# Patient Record
Sex: FEMALE | Race: AMERICAN INDIAN OR ALASKA NATIVE
[De-identification: names, ages, dates, MRNs, and addresses within clinical notes are randomized per-mention and may not be internally consistent; named-entity substitution may affect disease eponyms.]

---

## 2019-01-18 ENCOUNTER — HOSPITAL ENCOUNTER (INPATIENT)
Dept: HOSPITAL 5 - ED | Age: 64
LOS: 5 days | Discharge: HOME HEALTH | DRG: 393 | End: 2019-01-23
Attending: INTERNAL MEDICINE | Admitting: INTERNAL MEDICINE
Payer: MEDICARE

## 2019-01-18 DIAGNOSIS — F17.200: ICD-10-CM

## 2019-01-18 DIAGNOSIS — E83.52: ICD-10-CM

## 2019-01-18 DIAGNOSIS — J32.0: ICD-10-CM

## 2019-01-18 DIAGNOSIS — K59.00: ICD-10-CM

## 2019-01-18 DIAGNOSIS — Z88.8: ICD-10-CM

## 2019-01-18 DIAGNOSIS — G93.41: ICD-10-CM

## 2019-01-18 DIAGNOSIS — G89.29: ICD-10-CM

## 2019-01-18 DIAGNOSIS — K52.9: ICD-10-CM

## 2019-01-18 DIAGNOSIS — K29.70: ICD-10-CM

## 2019-01-18 DIAGNOSIS — G30.9: ICD-10-CM
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
## 2019-01-18 DIAGNOSIS — E11.649: ICD-10-CM
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
## 2019-01-18 DIAGNOSIS — M19.90: ICD-10-CM

## 2019-01-18 DIAGNOSIS — F02.80: ICD-10-CM

## 2019-01-18 DIAGNOSIS — Z23: ICD-10-CM

## 2019-01-18 DIAGNOSIS — E46: ICD-10-CM

## 2019-01-18 DIAGNOSIS — R55: ICD-10-CM

## 2019-01-18 DIAGNOSIS — Z71.6: ICD-10-CM

## 2019-01-18 DIAGNOSIS — K94.23: Primary | ICD-10-CM

## 2019-01-18 DIAGNOSIS — I10: ICD-10-CM

## 2019-01-18 DIAGNOSIS — Z88.1: ICD-10-CM

## 2019-01-18 DIAGNOSIS — M54.6: ICD-10-CM

## 2019-01-18 LAB
BASOPHILS # (AUTO): 0.1 K/MM3 (ref 0–0.1)
BASOPHILS NFR BLD AUTO: 1 % (ref 0–1.8)
EOSINOPHIL # BLD AUTO: 0.1 K/MM3 (ref 0–0.4)
EOSINOPHIL NFR BLD AUTO: 0.9 % (ref 0–4.3)
HCT VFR BLD CALC: 40.9 % (ref 30.3–42.9)
HGB BLD-MCNC: 13.4 GM/DL (ref 10.1–14.3)
LYMPHOCYTES # BLD AUTO: 2.8 K/MM3 (ref 1.2–5.4)
LYMPHOCYTES NFR BLD AUTO: 35 % (ref 13.4–35)
MCHC RBC AUTO-ENTMCNC: 33 % (ref 30–34)
MCV RBC AUTO: 91 FL (ref 79–97)
MONOCYTES # (AUTO): 0.5 K/MM3 (ref 0–0.8)
MONOCYTES % (AUTO): 6.7 % (ref 0–7.3)
PLATELET # BLD: 293 K/MM3 (ref 140–440)
RBC # BLD AUTO: 4.5 M/MM3 (ref 3.65–5.03)

## 2019-01-18 PROCEDURE — 90732 PPSV23 VACC 2 YRS+ SUBQ/IM: CPT

## 2019-01-18 PROCEDURE — 71275 CT ANGIOGRAPHY CHEST: CPT

## 2019-01-18 PROCEDURE — 93010 ELECTROCARDIOGRAM REPORT: CPT

## 2019-01-18 PROCEDURE — 36415 COLL VENOUS BLD VENIPUNCTURE: CPT

## 2019-01-18 PROCEDURE — 70551 MRI BRAIN STEM W/O DYE: CPT

## 2019-01-18 PROCEDURE — 85025 COMPLETE CBC W/AUTO DIFF WBC: CPT

## 2019-01-18 PROCEDURE — 85379 FIBRIN DEGRADATION QUANT: CPT

## 2019-01-18 PROCEDURE — 93005 ELECTROCARDIOGRAM TRACING: CPT

## 2019-01-18 PROCEDURE — 90686 IIV4 VACC NO PRSV 0.5 ML IM: CPT

## 2019-01-18 PROCEDURE — 82140 ASSAY OF AMMONIA: CPT

## 2019-01-18 PROCEDURE — 80048 BASIC METABOLIC PNL TOTAL CA: CPT

## 2019-01-18 PROCEDURE — 93880 EXTRACRANIAL BILAT STUDY: CPT

## 2019-01-18 PROCEDURE — 83970 ASSAY OF PARATHORMONE: CPT

## 2019-01-18 PROCEDURE — 85027 COMPLETE CBC AUTOMATED: CPT

## 2019-01-18 PROCEDURE — 70450 CT HEAD/BRAIN W/O DYE: CPT

## 2019-01-18 PROCEDURE — 87040 BLOOD CULTURE FOR BACTERIA: CPT

## 2019-01-18 PROCEDURE — 82962 GLUCOSE BLOOD TEST: CPT

## 2019-01-18 PROCEDURE — C9113 INJ PANTOPRAZOLE SODIUM, VIA: HCPCS

## 2019-01-18 PROCEDURE — 74177 CT ABD & PELVIS W/CONTRAST: CPT

## 2019-01-18 PROCEDURE — 80053 COMPREHEN METABOLIC PANEL: CPT

## 2019-01-18 PROCEDURE — 93306 TTE W/DOPPLER COMPLETE: CPT

## 2019-01-18 PROCEDURE — 82550 ASSAY OF CK (CPK): CPT

## 2019-01-18 PROCEDURE — 84484 ASSAY OF TROPONIN QUANT: CPT

## 2019-01-18 PROCEDURE — 87116 MYCOBACTERIA CULTURE: CPT

## 2019-01-18 PROCEDURE — 70544 MR ANGIOGRAPHY HEAD W/O DYE: CPT

## 2019-01-18 PROCEDURE — 71045 X-RAY EXAM CHEST 1 VIEW: CPT

## 2019-01-18 PROCEDURE — 99406 BEHAV CHNG SMOKING 3-10 MIN: CPT

## 2019-01-18 RX ADMIN — CEFTRIAXONE SODIUM SCH MLS/HR: 2 INJECTION, POWDER, FOR SOLUTION INTRAMUSCULAR; INTRAVENOUS at 22:35

## 2019-01-18 NOTE — EMERGENCY DEPARTMENT REPORT
ED General Adult HPI





- General


Chief complaint: Weakness


Stated complaint: BODY PAIN


Time Seen by Provider: 01/18/19 22:50


Source: patient, EMS


Mode of arrival: Stretcher


Limitations: No Limitations





- History of Present Illness


Initial comments: 


Patient is a 63-year-old female past medical history of arthritis and high blood

pressure presents with back pain patient states that she's been having some 

upper back pain and weakness has been going on for the last couple days.  She 

states for the last couple days she hasn't really been able to have much of an 

appetite.  Patient has a PEG tube inserted.  Patient's dad denies having chest 

pain any dysuria or any cough.  Her pain is a 7 out of 10 located in the upper 

back that doesn't radiate nothing makes it better or worse.








- Related Data


                                  Previous Rx's











 Medication  Instructions  Recorded  Last Taken  Type


 


Sulfamethoxazole/Trimethoprim 1 each PO BID #14 tablet 01/05/19 Unknown Rx





[Bactrim DS TAB]    


 


HYDROcodone/APAP 5-325 [[place] 1 - 2 each PO Q6HR PRN #14 tablet 01/19/19 Unknown

 Rx





5-325 mg TAB]    











                                    Allergies











Allergy/AdvReac Type Severity Reaction Status Date / Time


 


acetaminophen Allergy  Hives Verified 01/04/19 19:19





[From Darvocet-N 100]     


 


ketorolac [From Toradol] Allergy  Hives Verified 01/04/19 19:19


 


propoxyphene Allergy  Hives Verified 01/04/19 19:19





[From Darvocet-N 100]     














ED Review of Systems


ROS: 


Stated complaint: BODY PAIN


Other details as noted in HPI





Constitutional: denies: chills, fever


Eyes: denies: eye pain, eye discharge, vision change


ENT: denies: ear pain, throat pain


Respiratory: denies: cough, shortness of breath, wheezing


Cardiovascular: denies: chest pain, palpitations


Endocrine: no symptoms reported


Gastrointestinal: denies: abdominal pain, nausea, diarrhea


Genitourinary: denies: urgency, dysuria, discharge


Musculoskeletal: back pain.  denies: joint swelling, arthralgia


Skin: denies: rash, lesions


Neurological: denies: headache, weakness, paresthesias


Psychiatric: denies: anxiety, depression


Hematological/Lymphatic: denies: easy bleeding, easy bruising





ED Past Medical Hx





- Past Medical History


Previous Medical History?: Yes


Hx Hypertension: Yes


Hx Diabetes: Yes





- Surgical History


Past Surgical History?: Yes


Additional Surgical History: peg tube, gastric bypass





- Social History


Smoking Status: Former Smoker


Substance Use Type: None





- Medications


Home Medications: 


                                Home Medications











 Medication  Instructions  Recorded  Confirmed  Last Taken  Type


 


Sulfamethoxazole/Trimethoprim 1 each PO BID #14 tablet 01/05/19 01/19/19 Unknown

Rx





[Bactrim DS TAB]     


 


HYDROcodone/APAP 5-325 [[place]re 1 - 2 each PO Q6HR PRN #14 tablet 01/19/19  

Unknown Rx





5-325 mg TAB]     














ED Physical Exam





- General


Limitations: No Limitations


General appearance: alert, in no apparent distress





- Head


Head exam: Present: atraumatic, normocephalic





- Eye


Eye exam: Present: normal appearance





- ENT


ENT exam: Present: mucous membranes moist





- Neck


Neck exam: Present: normal inspection





- Respiratory


Respiratory exam: Present: normal lung sounds bilaterally.  Absent: respiratory 

distress





- Cardiovascular


Cardiovascular Exam: Present: normal rhythm, tachycardia.  Absent: systolic 

murmur, diastolic murmur, rubs, gallop





- GI/Abdominal


GI/Abdominal exam: Present: soft, normal bowel sounds





- Extremities Exam


Extremities exam: Present: normal inspection





- Back Exam


Back exam: Present: normal inspection





- Neurological Exam


Neurological exam: Present: alert, oriented X3





- Psychiatric


Psychiatric exam: Present: normal affect, normal mood





- Skin


Skin exam: Present: warm, dry, intact, normal color.  Absent: rash





ED Course


                                   Vital Signs











  01/18/19 01/18/19 01/18/19





  22:46 22:50 23:00


 


Temperature  98.3 F 


 


Pulse Rate  119 H 109 H


 


Respiratory  28 H 10 L





Rate   


 


Blood Pressure  164/82 139/84


 


Blood Pressure  164/82 





[Left]   


 


O2 Sat by Pulse 93 100 91





Oximetry   














  01/19/19 01/19/19





  00:01 02:00


 


Temperature  


 


Pulse Rate 106 H 92 H


 


Respiratory 14 10 L





Rate  


 


Blood Pressure 139/84 132/63


 


Blood Pressure  





[Left]  


 


O2 Sat by Pulse 98 100





Oximetry  














ED Medical Decision Making





- Lab Data


Result diagrams: 


                                 01/18/19 23:13





                                 01/18/19 23:13








                                   Lab Results











  01/18/19 01/18/19 01/18/19 Range/Units





  23:13 23:13 23:13 


 


WBC  7.9    (4.5-11.0)  K/mm3


 


RBC  4.50    (3.65-5.03)  M/mm3


 


Hgb  13.4    (10.1-14.3)  gm/dl


 


Hct  40.9    (30.3-42.9)  %


 


MCV  91    (79-97)  fl


 


MCH  30    (28-32)  pg


 


MCHC  33    (30-34)  %


 


RDW  13.2    (13.2-15.2)  %


 


Plt Count  293    (140-440)  K/mm3


 


Lymph % (Auto)  35.0    (13.4-35.0)  %


 


Mono % (Auto)  6.7    (0.0-7.3)  %


 


Eos % (Auto)  0.9    (0.0-4.3)  %


 


Baso % (Auto)  1.0    (0.0-1.8)  %


 


Lymph #  2.8    (1.2-5.4)  K/mm3


 


Mono #  0.5    (0.0-0.8)  K/mm3


 


Eos #  0.1    (0.0-0.4)  K/mm3


 


Baso #  0.1    (0.0-0.1)  K/mm3


 


Seg Neutrophils %  56.4    (40.0-70.0)  %


 


Seg Neutrophils #  4.4    (1.8-7.7)  K/mm3


 


Sodium   137   (137-145)  mmol/L


 


Potassium   3.7   (3.6-5.0)  mmol/L


 


Chloride   100.2   ()  mmol/L


 


Carbon Dioxide   20 L   (22-30)  mmol/L


 


Anion Gap   21   mmol/L


 


BUN   9   (7-17)  mg/dL


 


Creatinine   1.2   (0.7-1.2)  mg/dL


 


Estimated GFR   55   ml/min


 


BUN/Creatinine Ratio   8   %


 


Glucose   115 H   ()  mg/dL


 


Calcium   11.9 H   (8.4-10.2)  mg/dL


 


Total Bilirubin   0.40   (0.1-1.2)  mg/dL


 


AST   14   (5-40)  units/L


 


ALT   15   (7-56)  units/L


 


Alkaline Phosphatase   35   ()  units/L


 


Troponin T    < 0.010  (0.00-0.029)  ng/mL


 


Total Protein   6.8   (6.3-8.2)  g/dL


 


Albumin   4.8   (3.9-5)  g/dL


 


Albumin/Globulin Ratio   2.4   %














- EKG Data


-: EKG Interpreted by Me





- EKG Data





01/19/19 02:35


EKG shows sinus tachycardia no ST segment elevation or T-wave inversion normal 

axis





- Radiology Data


Radiology results: report reviewed, image reviewed





Chest x-ray: Shows no acute cardiopulmonary disease





- Medical Decision Making





Chief medical diagnosis: Lumbar sacral strain


Differential medical diagnosis: Arthralgia, sepsis, dehydration





PATIENT IV antibiotics, 30 mL per kilo, CBC and BMP and oral and IV pain 

medicine





She does not have sepsis white count was normal and no longer has any 

tachycardia I will send patient home with oral pain medication discussed plan 

with patient. She agrees with plan additional verbal discharge instructions were

 given. 


Critical care attestation.: 


If time is entered above; I have spent that time in minutes in the direct care 

of this critically ill patient, excluding procedure time.








ED Disposition


Clinical Impression: 


 Weakness





Back pain


Qualifiers:


 Back pain location: thoracic back pain Chronicity: acute Back pain laterality: 

midline Qualified Code(s): M54.6 - Pain in thoracic spine





Disposition: DC-01 TO HOME OR SELFCARE


Is pt being admited?: No


Does the pt Need Aspirin: No


Condition: Stable


Instructions:  Arthralgia (ED)


Prescriptions: 


HYDROcodone/APAP 5-325 [Norco 5-325 mg TAB] 1 - 2 each PO Q6HR PRN #14 tablet


 PRN Reason: Pain


Referrals: 


[facility] VILLARREAL [Other] - 3-5 Days

## 2019-01-19 LAB
ALBUMIN SERPL-MCNC: 4.8 G/DL (ref 3.9–5)
ALT SERPL-CCNC: 15 UNITS/L (ref 7–56)
BUN SERPL-MCNC: 9 MG/DL (ref 7–17)
BUN/CREAT SERPL: 8 %
CALCIUM SERPL-MCNC: 11.9 MG/DL (ref 8.4–10.2)
HEMOLYSIS INDEX: 8

## 2019-01-19 RX ADMIN — PANTOPRAZOLE SODIUM SCH: 40 INJECTION, POWDER, FOR SOLUTION INTRAVENOUS at 17:34

## 2019-01-19 RX ADMIN — AZITHROMYCIN SCH MLS/HR: 500 INJECTION, POWDER, LYOPHILIZED, FOR SOLUTION INTRAVENOUS at 00:20

## 2019-01-19 RX ADMIN — AZITHROMYCIN SCH: 500 INJECTION, POWDER, LYOPHILIZED, FOR SOLUTION INTRAVENOUS at 23:00

## 2019-01-19 RX ADMIN — CEFTRIAXONE SODIUM SCH: 2 INJECTION, POWDER, FOR SOLUTION INTRAMUSCULAR; INTRAVENOUS at 23:00

## 2019-01-19 NOTE — VASCULAR LAB REPORT
FINAL REPORT



EXAM:  VL CAROTID DUPLEX BILAT



HISTORY:  syncope 



COMPARISON:  None available. 



TECHNIQUE:  Several real-time grayscale and color Doppler images were obtained. 



FINDINGS:  

On the right, peak systolic velocity within the common carotid artery 44 centimeters/second, internal
 carotid artery 57, external carotid artery 116. 



On the left, peak systolic velocity within the common carotid artery 89 centimeters/second, internal 
carotid artery 110, external carotid artery 44. 



There mild to moderate calcified plaque at the right carotid bifurcation. Antegrade flow in the verte
bral arteries bilaterally. 



IMPRESSION:  

Mild-to-moderate calcified plaque at the right carotid bifurcation. Estimated stenosis less than 50 p
ercent by velocity criteria.

## 2019-01-19 NOTE — XRAY REPORT
FINAL REPORT



PROCEDURE:  XR CHEST 1V AP



TECHNIQUE:  Chest radiograph anteroposterior view. CPT 10124







HISTORY:  tachy 



COMPARISON:  No prior studies are available for comparison.



FINDINGS:  

Heart: Normal.



Mediastinum/Vessels: Normal.



Lungs/Pleural space: Normal.



Bony thorax: No acute osseous abnormality.



Life support devices: None.



IMPRESSION:  

No acute cardiopulmonary abnormality.

## 2019-01-19 NOTE — PROGRESS NOTE
Subjective


Date of service: 01/19/19


Interval history: 


went over the CT of the head it shows moderate age related atrophy  nop evidence

of stroke  the carotyid doppler has fingdings of less than 50% stenosis t6here 

is calcified tino frey  went over labs and prior hx from the ED it appears

she was having syncope post episodes of severe vertigo despite therapy with 

meclizine the is hx of being in pain all over  ... r/o fibromyalgia








Objective





- Vital Sign


                               Vital Signs - 12hr











  01/19/19 01/19/19 01/19/19





  07:30 07:40 07:50


 


Temperature   


 


Pulse Rate 91 H 96 H 90


 


Respiratory 11 L 18 10 L





Rate   


 


Blood Pressure 137/90 137/90 137/90


 


O2 Sat by Pulse 100 100 100





Oximetry   














  01/19/19 01/19/19 01/19/19





  08:00 11:32 12:04


 


Temperature   98.6 F


 


Pulse Rate 93 H 92 H 96 H


 


Respiratory 15  20





Rate   


 


Blood Pressure 137/90  123/71


 


O2 Sat by Pulse 77 L  97





Oximetry   














- Laboratory Findings


CBC and BMP: 


                                 01/18/19 23:13





                                 01/18/19 23:13


Abnormal Lab Findings: 


                                  Abnormal Labs











  01/18/19 01/19/19





  23:13 06:57


 


D-Dimer   2420.43 H


 


Carbon Dioxide  20 L 


 


Glucose  115 H 


 


Calcium  11.9 H

## 2019-01-19 NOTE — HISTORY AND PHYSICAL REPORT
History of Present Illness


Date of examination: 01/19/19


Date of admission: 


01/19/19 08:28





Chief complaint: 


Abdominal pains


History of present illness: 


Patient is a 62 yo woman with a history of type 2 DM, hypertension, tobacco 

dependency and PEG tube placement who presented to [facility] ED with c/o hot feeling 

over her back going through her neck, feeling weak and can't hold anything by 

mouth with N/V. She was given Meclizine in ED and was going to be discharge; 

however, she had a syncope episode in ED, but no details garnered. Looking back 

at prior records, she was just here ([facility] ED) on 1/4/2019 for PEG issues and it 

seems she was discharged home on Bactrim and narcotics. Patient is extremely 

poor historian, she doesn't know why she has a PEG. She repeatedly says, "I 

forgot the question, can you ask me again?" She admits to memory problem. One 

minutes she is talking okay then next minute, she can't complete a sentence 

without forgetting what she is saying. She tells me that she is from [place] but unable to tell me how long she has been here. I asked for a family 

member or friend and she says that her daughter phone is turned off.  She unable

to tell me if she had a stroke or dementia. 





PMH: as hpi


PSH: PEG tube


SH: tobacco dependency, denies alcohol, illegal drugs


FH: she doesn't remember





ROS: unable due to abnormal mental status





Medications and Allergies


                                    Allergies











Allergy/AdvReac Type Severity Reaction Status Date / Time


 


acetaminophen Allergy  Hives Verified 01/04/19 19:19





[From Darvocet-N 100]     


 


ketorolac [From Toradol] Allergy  Hives Verified 01/04/19 19:19


 


propoxyphene Allergy  Hives Verified 01/04/19 19:19





[From Darvocet-N 100]     











                                Home Medications











 Medication  Instructions  Recorded  Confirmed  Last Taken  Type


 


Sulfamethoxazole/Trimethoprim 1 each PO BID #14 tablet 01/05/19 01/19/19 Unknown

Rx





[Bactrim DS TAB]     


 


HYDROcodone/APAP 5-325 [[place] 1 - 2 each PO Q6HR PRN #14 tablet 01/19/19  

Unknown Rx





5-325 mg TAB]     











Active Meds: 


Active Medications





Azithromycin 500 mg/ Sodium (Chloride)  250 mls @ 250 mls/hr IV Q24H ADDI; 

Protocol


   Last Admin: 01/19/19 00:20 Dose:  250 mls/hr


   Documented by: 


Ceftriaxone Sodium (Rocephin/Ns 2 Gm/100 Ml)  2 gm in 100 mls @ 200 mls/hr IV 

Q24H ADDI; Protocol


   Last Admin: 01/18/19 22:35 Dose:  200 mls/hr


   Documented by: 


Pneumococcal Polyvalent Vaccine (Pneumovax 23)  0.5 ml IM .ONCE ONE


   Stop: 01/20/19 12:01











Exam





- Physical Exam


Narrative exam: 


Gen: WDWN, unkempt hair, red dread locks with bilateral temple alopecia with a 

purple shower cap on, NAD, Awake, Alert, Orientated x 3


HEENT: NCAT, EOMI, PERRL, OP Clear 


Neck: supple, no adenopathy, no thyromegaly, no JVD 


CVS/Heart: RRR, normal S1S2, pulses present bilaterally 


Chest/Lungs: CTA B, Symmetrical chest expansion, good air entry bilaterally 


GI/Abdomen: soft, diffuse tenderness, peg in place, good bowel sounds, no 

guarding or rebound 


/Bladder: no suprapubic tenderness, no CVA or paraspinal tenderness 


Extermity/Skin: no c/c/e, no obvious rash 


MSK: FROM x 4 


Neuro: CN 2-12 grossly intact, no new focal deficits 


Psych: calm but poor memory recall.








- Constitutional


Vitals: 


                                        











Temp Pulse Resp BP Pulse Ox


 


 98.3 F   93 H  15   137/90   77 L


 


 01/18/19 22:50  01/19/19 08:00  01/19/19 08:00  01/19/19 08:00  01/19/19 08:00














Results





- Labs


CBC & Chem 7: 


                                 01/18/19 23:13





                                 01/18/19 23:13


Labs: 


                              Abnormal lab results











  01/18/19 01/19/19 Range/Units





  23:13 06:57 


 


D-Dimer   2420.43 H  (0-234)  ng/mlDDU


 


Carbon Dioxide  20 L   (22-30)  mmol/L


 


Glucose  115 H   ()  mg/dL


 


Calcium  11.9 H   (8.4-10.2)  mg/dL














Assessment and Plan


Patient is a 62 yo woman with a history of type 2 DM, hypertension, tobacco 

dependency, OA on chronic pain medication on percocet for years, constipation 

and PEG tube placement who presented to [facility] ED with c/o hot feeling over her 

back going through her neck, feeling weak and can't hold anything by mouth with 

N/V. She was given Meclizine in ED and was going to be discharge; however, she 

had a syncope episode in ED, but no details listed. Looking back at prior 

records, she was just here ([facility] ED) on 1/4/2019 for PEG issues and it seems she

was discharged home on Bactrim and narcotics (reason for the Bactrim or if 

patient even taking is unknown). Patient is extremely poor historian, she 

doesn't know why she has a PEG. She hasn't been using





* CT head wo contrast Impression: There is no evidence of an acute intracranial 

  process


* pCXR Impression: No acute cardiopulmonary abnormality





-Abd pains/N/V, ?colitis: get CT abd/pelvis with contrast, she had ct ab/pelvis 

without contrast on 1/4/19, use PEG tube instead of oral feeding. 


-Syncope, appears vasovagal: Ordered ECHO, carotid doppler, neurochecks


-AMS, acute metabolic encephalopathy most likely vs stroke vs dementia: get MRI 

brain, consulted and spoke with Dr. Carlson


-Type 2 DM, diet control because of hypoglycemia off lantus/insulin: treat with 

ssi


-Tobacco dependency:  on stopping


-PEG due to malnutriton and weight loss: get cT abd/pelvis, GI consult


-Hypercalcemia, daughter told me she has this for years, she had colonoscopy and

she had a blockage(?) and chronic constipation: get ct ab/pelvis with contrast, 

we did discuss probably cancer related


-Elevated D-Dimer: CTA chest 


-DVT prophylaxis: sq heparin


-Home reconciliation not done, home meds at Addie's home: RN to call Addie and

get list of home medication so that I may reconcile home meds


full code





I called daughter Tahira and her phone is off then called granddaugher Rohinijose r 

at [phone] and spoke with her, patient lives with granddaughter, lives in 

[place], moved here 2-3 month ago. Then Allison handed the phone to her 

mother, Tahira.  PEG last year in [place], because lost 

appetite==>Alzheimer Dementia related most likely but never diagnosis. Patient 

actually was at her daughter Addie's house, [phone], yesterday





[facility] in [facility] is where PEG 

was place. I have asked Tahira to come and sign medical release form. 





CCT 36 minutes

## 2019-01-19 NOTE — CAT SCAN REPORT
FINAL REPORT



PROCEDURE:  CT HEAD/BRAIN WO CON



TECHNIQUE:  Computerized tomography of the head was performed without contrast material. 



HISTORY:  dizziness 



COMPARISON:  No prior studies are available for comparison.



FINDINGS:  

Skull and scalp: Normal.



Paranasal sinuses: Normal.



Ventricles and subarachnoid spaces: Normal.



Cerebrum: No evidence of hemorrhage, acute infarction or mass .



Cerebellum and brainstem: No evidence of hemorrhage, acute infarction or mass.



Vasculature: Normal.



Comments: None.



IMPRESSION:  

There is no evidence of an acute intracranial process

## 2019-01-20 LAB
BUN SERPL-MCNC: 5 MG/DL (ref 7–17)
BUN/CREAT SERPL: 4 %
CALCIUM SERPL-MCNC: 10.9 MG/DL (ref 8.4–10.2)
HCT VFR BLD CALC: 39.7 % (ref 30.3–42.9)
HEMOLYSIS INDEX: 26
HGB BLD-MCNC: 12.9 GM/DL (ref 10.1–14.3)
MCHC RBC AUTO-ENTMCNC: 33 % (ref 30–34)
MCV RBC AUTO: 91 FL (ref 79–97)
PLATELET # BLD: 252 K/MM3 (ref 140–440)
RBC # BLD AUTO: 4.36 M/MM3 (ref 3.65–5.03)

## 2019-01-20 PROCEDURE — 02HV33Z INSERTION OF INFUSION DEVICE INTO SUPERIOR VENA CAVA, PERCUTANEOUS APPROACH: ICD-10-PCS | Performed by: RADIOLOGY

## 2019-01-20 PROCEDURE — B548ZZA ULTRASONOGRAPHY OF SUPERIOR VENA CAVA, GUIDANCE: ICD-10-PCS | Performed by: RADIOLOGY

## 2019-01-20 PROCEDURE — 3E0234Z INTRODUCTION OF SERUM, TOXOID AND VACCINE INTO MUSCLE, PERCUTANEOUS APPROACH: ICD-10-PCS | Performed by: INTERNAL MEDICINE

## 2019-01-20 RX ADMIN — AZITHROMYCIN SCH: 500 INJECTION, POWDER, LYOPHILIZED, FOR SOLUTION INTRAVENOUS at 22:52

## 2019-01-20 RX ADMIN — OXYCODONE AND ACETAMINOPHEN PRN TAB: 5; 325 TABLET ORAL at 11:34

## 2019-01-20 RX ADMIN — CEFTRIAXONE SODIUM SCH MLS/HR: 2 INJECTION, POWDER, FOR SOLUTION INTRAMUSCULAR; INTRAVENOUS at 10:57

## 2019-01-20 RX ADMIN — AZITHROMYCIN SCH MLS/HR: 500 INJECTION, POWDER, LYOPHILIZED, FOR SOLUTION INTRAVENOUS at 11:04

## 2019-01-20 RX ADMIN — HEPARIN SODIUM SCH UNIT: 5000 INJECTION, SOLUTION INTRAVENOUS; SUBCUTANEOUS at 22:43

## 2019-01-20 RX ADMIN — OXYCODONE AND ACETAMINOPHEN PRN TAB: 5; 325 TABLET ORAL at 02:13

## 2019-01-20 RX ADMIN — PANTOPRAZOLE SODIUM SCH MG: 40 INJECTION, POWDER, FOR SOLUTION INTRAVENOUS at 10:58

## 2019-01-20 RX ADMIN — OXYCODONE AND ACETAMINOPHEN PRN TAB: 5; 325 TABLET ORAL at 07:55

## 2019-01-20 RX ADMIN — OXYCODONE AND ACETAMINOPHEN PRN TAB: 5; 325 TABLET ORAL at 22:39

## 2019-01-20 RX ADMIN — HEPARIN SODIUM SCH: 5000 INJECTION, SOLUTION INTRAVENOUS; SUBCUTANEOUS at 18:11

## 2019-01-20 RX ADMIN — TOPIRAMATE SCH MG: 100 TABLET, FILM COATED ORAL at 01:25

## 2019-01-20 RX ADMIN — CEFTRIAXONE SODIUM SCH MLS/HR: 2 INJECTION, POWDER, FOR SOLUTION INTRAMUSCULAR; INTRAVENOUS at 22:48

## 2019-01-20 RX ADMIN — TOPIRAMATE SCH MG: 100 TABLET, FILM COATED ORAL at 10:57

## 2019-01-20 RX ADMIN — TOPIRAMATE SCH MG: 100 TABLET, FILM COATED ORAL at 22:41

## 2019-01-20 NOTE — CAT SCAN REPORT
FINAL REPORT



EXAM:  CT ANGIO CHEST



HISTORY:  syncope 



TECHNIQUE:  Enhanced CT of the chest at 1.25 mm axial intervals following a pulmonary embolism protoc
ol. Coronal and sagittal imaging were also obtained.  Coronal oblique MIP projections were obtained.



Contrast: 100 ml of Omnipaque 350 given IV.



PRIORS:  None.



FINDINGS:  

There is no evidence for pulmonary embolism in the main pulmonary artery, right and left pulmonary ar
teries or their major distributions. However, CT does not exclude distal pulmonary emboli.



Otherwise, the lung parenchyma are expanded and clear with no evidence for parenchymal nodules, infil
trates, congestion, or pleural effusion.  There is no evidence for mediastinal, hilar, or axillary ad
enopathy. Cardiovascular structures are within normal limits. No evidence for ventricular chamber enl
argement is seen.



Images through the lung bases include the upper abdomen which show 1.2 cm cyst in the posterior mid p
ole left kidney. Nodularity of both adrenal glands is noted. 



Bony structures demonstrate no focal abnormalities.



IMPRESSION:  

1. no evidence for pulmonary embolism. 



2. Cyst in the mid pole left kidney 



3. Bilateral adrenal gland nodularity

## 2019-01-20 NOTE — CAT SCAN REPORT
FINAL REPORT



EXAM:  CT ABDOMEN PELVIS W CON



HISTORY:  abd pains,n/v 



TECHNIQUE:  Standard enhanced CT of the abdomen and pelvis. Coronal and sagittal reconstruction was a
lso performed.



Contrast:  100 mL Omnipaque 350 given IV. 



PRIORS:  CT a/P 01/05/2019



FINDINGS:  

Within the abdomen, the liver, pancreas, gallbladder, adrenal glands, and right kidney are unremarkab
le. Multiple hypodense cysts in the spleen and left kidney are stable. PEG tube is in place in the [facility]. No evidence for retroperitoneal or pelvic lymphadenopathy is seen.  The bowel loops have baltazar
l caliber. No soft tissue mass, fluid collection, inflammatory change, or free air is seen within the
 abdomen or pelvis. The appendix is normal. Moderate calcification of aorta is seen. 



Within the pelvis, the bladder is overly distended. The uterus is normal. No evidence for mass or lym
phadenopathy is seen in the pelvis. Diverticulosis of the sigmoid colon is noted. 



Images through the upper abdomen include the lung bases which are expanded and clear.



Bony structures show facet joint degenerative changes at L4 through S1 bilaterally. 



IMPRESSION:  

No acute intra-abdominal process noted. No significant change.

## 2019-01-20 NOTE — XRAY REPORT
FINAL REPORT



EXAM:  XR CHEST 1V AP



HISTORY:  right IJV catheter placement 



COMPARISON:  Chest radiograph performed on 01/18/2019



TECHNIQUE:  Single frontal view of the chest



FINDINGS:  

Right internal jugular catheter with tip at the superior cavoatrial junction. 



The cardiomediastinal silhouette is normal in appearance. 



The lungs are clear without focal consolidation. No pleural effusion or pneumothorax. 



No acute bony or soft tissue abnormality. 



IMPRESSION:  

Right internal jugular catheter with tip at the superior cavoatrial junction.



No acute cardiopulmonary disease.

## 2019-01-20 NOTE — OPERATIVE REPORT
Operative Report


Operative Report: 





Exam: Ultrasound to placement of right IJ triple-lumen catheter





Clinical indication: Patient requiring central venous access for CT scan





Date: 1/20/2019





Procedure: Following an explanation of the risks, benefits and alternatives; 

written informed consent was obtained.  The procedure was performed at bedside 

in the [facility].  Initial ultrasound evaluation the patient's right neck 

demonstrated a patent right internal jugular vein.  The patient's right neck was

prepped and draped in the usual sterile fashion.  1% lidocaine was used for 

anesthesia.





Under ultrasound guidance, the right internal jugular vein was cannulated with a

7 cm 18-gauge needle.  A 0.035 guidewire was advanced centrally easily.  The n

eedle was removed and following serial dilation, a triple catheter was advanced 

over the guidewire centrally easily.  The guidewire was removed.  All 3 ports 

returned nonpulsatile blood.  The catheter was locked with sterile saline.  The 

catheter was securely fastened of the neck at the skin surface using 3-0 nylon 

suture.  A stat lock device was also used.  A sterile dressing was applied.





The patient tolerated the procedure well.  There were no immediate post 

procedure complications.





Post procedure chest x-ray was ordered.





Impression: Ultrasound guided placement of right IJ triple-lumen catheter

## 2019-01-20 NOTE — PROGRESS NOTE
Assessment and Plan


Assessment and plan: 


Patient is a 64 yo woman with a history of type 2 DM, hypertension, tobacco 

dependency, OA on chronic pain medication on percocet for years, constipation 

and PEG tube placement who presented to [facility] ED with c/o hot feeling over her 

back going through her neck, feeling weak and can't hold anything by mouth with 

N/V. She was given Meclizine in ED and was going to be discharge; however, she 

had a syncope episode in ED, but no details listed. Looking back at prior 

records, she was just here ([facility] ED) on 2019 for PEG issues and it seems she

was discharged home on Bactrim and narcotics (reason for the Bactrim or if 

patient even taking is unknown). Patient is extremely poor historian, she 

doesn't know why she has a PEG. She hasn't been using PEG tube feeding because 

no MD follow up established yet. I called daughter Tahira and her phone is off 

then called granddaugher Allison at [phone] and spoke with her, patient 

lives with granddaughter, lives in [place], moved here 2-3 month ago. 

Then Allison handed the phone to her mother, Tahira.  PEG last year in [place] 

[place], because lost appetite==>Alzheimer Dementia related most likely but 

never diagnosis. Patient actually was at her daughter Adide's house, 

[phone], yesterday, [facility] in [facility] is where PEG was place. I have asked Tahira to come and sign 

medical release form. 





* CT head wo contrast Impression: There is no evidence of an acute intracranial 

  process


* pCXR Impression: No acute cardiopulmonary abnormality





-Abd pains/N/V, ?colitis: get CT abd/pelvis with contrast, she had ct ab/pelvis 

without contrast on 19, use PEG tube instead of oral feeding. 


-Syncope, appears vasovagal: Ordered ECHO, carotid doppler, neurochecks


-AMS, acute metabolic encephalopathy most likely vs stroke vs dementia: get MRI 

brain, consulted and spoke with Dr. Carlson


-Type 2 DM, diet control because of hypoglycemia off lantus/insulin: treat with 

ssi


-Tobacco dependency:  on stopping


-PEG due to malnutriton and weight loss: get cT abd/pelvis, GI consult


-Hypercalcemia, daughter told me she has this for years, she had colonoscopy and

she had a blockage(?) and chronic constipation: get ct ab/pelvis with contrast, 

we did discuss probably cancer related


-Elevated D-Dimer: CTA chest to rule in PE


-DVT prophylaxis: sq heparin


-Home reconciliation not done, home meds at [facility] home: RN to call [facility] and

get list of home medication so that I may reconcile home meds


full code





Patient unable to get CTA chest/abd/pelvis because no peripheral IV line, so i 

consulted Dr. Pinedo and moved her to [facility] for line placement. Spoke with nurse

René to call CT department and she can go back to tele after testing.  





History


Interval history: 


Patient was seen and examined. Follow-up on current diagnosis of abd pains, 

tolerating percocet. Overnight uneventful. Patient denies any chest pain, 

shortness breath, nausea/vomiting or severe headaches. Imaging, nursing note, 

chart, labs and old chart reviewed. Discussed with patient. 





Hospitalist Physical





- Physical exam


Narrative exam: 


Gen: WDWN, unkempt hair, red dread locks with bilateral temple alopecia with a 

purple shower cap on, NAD, Awake, Alert, Orientated x 3


HEENT: NCAT, EOMI, PERRL, OP Clear 


Neck: supple, no adenopathy, no thyromegaly, no JVD 


CVS/Heart: RRR, normal S1S2, pulses present bilaterally 


Chest/Lungs: CTA B, Symmetrical chest expansion, good air entry bilaterally 


GI/Abdomen: soft, diffuse tenderness, peg in place, good bowel sounds, no 

guarding or rebound 


/Bladder: no suprapubic tenderness, no CVA or paraspinal tenderness 


Extermity/Skin: no c/c/e, no obvious rash 


MSK: FROM x 4 


Neuro: CN 2-12 grossly intact, no new focal deficits 


Psych: calm but poor memory recall.








- Constitutional


Vitals: 


                                        











Temp Pulse Resp BP Pulse Ox


 


 97.5 F L  101 H  18   137/77   100 


 


 19 11:44  19 11:44  19 11:44  19 11:44  19 11:44














Results





- Labs


CBC & Chem 7: 


                                 19 07:10





                                 19 07:10


Labs: 


                             Laboratory Last Values











WBC  6.4 K/mm3 (4.5-11.0)   19  07:10    


 


RBC  4.36 M/mm3 (3.65-5.03)   19  07:10    


 


Hgb  12.9 gm/dl (10.1-14.3)   19  07:10    


 


Hct  39.7 % (30.3-42.9)   19  07:10    


 


MCV  91 fl (79-97)   19  07:10    


 


MCH  30 pg (28-32)   19  07:10    


 


MCHC  33 % (30-34)   19  07:10    


 


RDW  13.1 % (13.2-15.2)  L  19  07:10    


 


Plt Count  252 K/mm3 (140-440)   19  07:10    


 


Lymph % (Auto)  35.0 % (13.4-35.0)   19  23:13    


 


Mono % (Auto)  6.7 % (0.0-7.3)   19  23:13    


 


Eos % (Auto)  0.9 % (0.0-4.3)   19  23:13    


 


Baso % (Auto)  1.0 % (0.0-1.8)   19  23:13    


 


Lymph #  2.8 K/mm3 (1.2-5.4)   19  23:13    


 


Mono #  0.5 K/mm3 (0.0-0.8)   19  23:13    


 


Eos #  0.1 K/mm3 (0.0-0.4)   19  23:13    


 


Baso #  0.1 K/mm3 (0.0-0.1)   19  23:13    


 


Seg Neutrophils %  56.4 % (40.0-70.0)   19  23:13    


 


Seg Neutrophils #  4.4 K/mm3 (1.8-7.7)   19  23:13    


 


D-Dimer  2420.43 ng/mlDDU (0-234)  H  19  06:57    


 


Sodium  144 mmol/L (137-145)  D 19  07:10    


 


Potassium  3.8 mmol/L (3.6-5.0)   19  07:10    


 


Chloride  105.5 mmol/L ()   19  07:10    


 


Carbon Dioxide  26 mmol/L (22-30)   19  07:10    


 


Anion Gap  16 mmol/L  19  07:10    


 


BUN  5 mg/dL (7-17)  L  19  07:10    


 


Creatinine  1.2 mg/dL (0.7-1.2)   19  07:10    


 


Estimated GFR  55 ml/min  19  07:10    


 


BUN/Creatinine Ratio  4 %  19  07:10    


 


Glucose  60 mg/dL ()  L  19  07:10    


 


POC Glucose  93  ()   19  11:08    


 


Lactic Acid  1.00 mmol/L (0.7-2.0)   19  06:57    


 


Calcium  10.9 mg/dL (8.4-10.2)  H  19  07:10    


 


Total Bilirubin  0.40 mg/dL (0.1-1.2)   19  23:13    


 


AST  14 units/L (5-40)   19  23:13    


 


ALT  15 units/L (7-56)   19  23:13    


 


Alkaline Phosphatase  35 units/L ()   19  23:13    


 


Total Creatine Kinase  67 units/L ()   19  21:08    


 


Troponin T  < 0.010 ng/mL (0.00-0.029)   19  23:13    


 


Total Protein  6.8 g/dL (6.3-8.2)   19  23:13    


 


Albumin  4.8 g/dL (3.9-5)   19  23:13    


 


Albumin/Globulin Ratio  2.4 %  19  23:13    


 


PTH Intact  67.17 pg/mL (15-65)  H  19  21:08    














Nutrition/Malnutrition Assess





- Dietary Evaluation


Nutrition/Malnutrition Findings: 


                                        





Nutrition Notes                                            Start:  19 

16:40


Freq:                                                      Status: Active       




Protocol:                                                                       




 Document     19 11:00  DELMI  (Rec: 19 11:06  DELMI  SHIRLEY-

[id number])


 Nutrition Notes


     Need for Assessment generated from:         MD Order


     Initial or Follow up                        Assessment


     Other Pertinent Diagnosis                   Generalized weakness


     Current Diet                                NPO


     Labs/Tests                                  BG 60


                                                 Ca 10.9


     Pertinent Medications                       Reviewed


     Height                                      5 ft 1 in


     Weight                                      69.1 kg


     Ideal Body Weight (lbs)                     105.0


     BMI                                         28.8


     Subjective/Other Information                RD consulted for TF.  Unable


                                                 to access MD notes at this


                                                 time for PMHx.  Pt scheduled


                                                 for testing this am.


     Burn                                        Absent


     Trauma                                      Absent


     #1


      Nutrition Diagnosis                        Inadequate oral intake


      Etiology                                   ?


      As Evidenced by Signs and Symptoms         pt NPO


     Is patient on ventilator?                   No


     Is Patient Ambulatory and/or Out of Bed     No


     REE-([place]-confined to bed)      1413.456


     Calculation Used for Recommendations        [place]


     Additional Notes                            Pro needs 1-1.2g/k-83g/


                                                 day


                                                 Fluid needs per MD.


 Nutrition Intervention


     Nutrition Support:                          Jevity 1.2 at 50ml/hr with


                                                 80ml water flush q4h.


     Kcal                                        1,440


     Protein (gm)                                67


     Fluid (mL)                                  968


     Fiber (gm)                                  22


     Goal #1                                     TF tolerance


     Goal #2                                     TF to meet at least 75% energy


                                                 and pro needs


     Anticipated Discharge Needs:                Continue TF if necessary


     Follow-Up By:                               19


     Additional Comments                         F/U: new TF, MST assessment

## 2019-01-20 NOTE — PROGRESS NOTE
Subjective


Date of service: 01/20/19


Interval history: 


parathyroid hormone level elevated which explaines to degree the elevated 

calcium  the dementia of patient does not allow her to understand if this issue 

addressed previous   MRI ordered the hardware in the c spine looks intact  

nothing loose that expalins dysphagia hx is veriy impossible to unravel about 

the dysphagia doubt it is from stroke could have been from c spine surgery








Objective





- Vital Sign


                               Vital Signs - 12hr











  01/20/19 01/20/19 01/20/19





  07:41 08:00 10:00


 


Temperature 97.8 F  


 


Pulse Rate 90 90 


 


Respiratory 16  





Rate   


 


Blood Pressure 97/80  


 


O2 Sat by Pulse 100  100





Oximetry   














  01/20/19 01/20/19





  11:44 16:00


 


Temperature 97.5 F L 


 


Pulse Rate 101 H 88


 


Respiratory 18 





Rate  


 


Blood Pressure 137/77 


 


O2 Sat by Pulse 100 





Oximetry  














- Laboratory Findings


CBC and BMP: 


                                 01/20/19 07:10





                                 01/20/19 07:10


Abnormal Lab Findings: 


                                  Abnormal Labs











  01/18/19 01/19/19 01/19/19





  23:13 06:57 21:08


 


RDW   


 


D-Dimer   2420.43 H 


 


Carbon Dioxide  20 L  


 


BUN   


 


Glucose  115 H  


 


POC Glucose   


 


Calcium  11.9 H  


 


PTH Intact    67.17 H














  01/20/19 01/20/19 01/20/19





  00:16 07:10 07:10


 


RDW   13.1 L 


 


D-Dimer   


 


Carbon Dioxide   


 


BUN    5 L


 


Glucose    60 L


 


POC Glucose  108 H  


 


Calcium    10.9 H


 


PTH Intact

## 2019-01-21 RX ADMIN — AZITHROMYCIN SCH MG: 250 TABLET, FILM COATED ORAL at 15:10

## 2019-01-21 RX ADMIN — HEPARIN SODIUM SCH UNIT: 5000 INJECTION, SOLUTION INTRAVENOUS; SUBCUTANEOUS at 23:13

## 2019-01-21 RX ADMIN — CEFTRIAXONE SODIUM SCH MLS/HR: 2 INJECTION, POWDER, FOR SOLUTION INTRAMUSCULAR; INTRAVENOUS at 23:13

## 2019-01-21 RX ADMIN — OXYCODONE AND ACETAMINOPHEN PRN TAB: 5; 325 TABLET ORAL at 15:10

## 2019-01-21 RX ADMIN — POLYETHYLENE GLYCOL 3350 SCH GM: 17 POWDER, FOR SOLUTION ORAL at 15:14

## 2019-01-21 RX ADMIN — TOPIRAMATE SCH MG: 100 TABLET, FILM COATED ORAL at 09:32

## 2019-01-21 RX ADMIN — TOPIRAMATE SCH MG: 100 TABLET, FILM COATED ORAL at 23:16

## 2019-01-21 RX ADMIN — OXYCODONE AND ACETAMINOPHEN PRN TAB: 5; 325 TABLET ORAL at 06:17

## 2019-01-21 RX ADMIN — HEPARIN SODIUM SCH UNIT: 5000 INJECTION, SOLUTION INTRAVENOUS; SUBCUTANEOUS at 09:32

## 2019-01-21 RX ADMIN — PANTOPRAZOLE SODIUM SCH MG: 40 INJECTION, POWDER, FOR SOLUTION INTRAVENOUS at 09:32

## 2019-01-21 NOTE — CONSULTATION
HISTORY OF PRESENT ILLNESS:  This is a 63-year-old black female who is admitted

through the Emergency Room Department of [facility] on

01/18/2019.  The patient initially presented to the hospital with primary

complaints of having a severe vertigo, difficulty standing, and was too dizzy to

be discharged.  She was given meclizine and multiple attempts at modifying

medications, continued to be profoundly dizzy.  She had a prior medical history

of having a PEG tube placement, swallowing difficulty, severe back pain, was on

antihypertensive agents.  She did have a history of arthritis and a pain

syndrome, which was being treated with Norco 3 times a day.  She previously had

allergies to DARVOCET AND KETOROLAC.  Past medical history is difficult to

obtain from the patient because she has dementia.  No family history is

available with her.  My history taken directly from the patient indicates that

she has swallowing problems that this may have occurred following cervical spine

surgery.  She states to me she had the surgery done in [place], ____ in

her neck.



PHYSICAL EXAMINATION:

NEUROLOGIC:  On my examination, she is alert, appropriate.  She is disoriented

to situation, time, place and general judgment and insight into her medical

condition.  Her  strength is equal.  Motor tone is symmetrical.  Her voice

does not sound terribly dysarthric and she does not have demonstrated any gross

evidence of dysphagia.  She does not have any evidence that she is aspirating. 

She has no stridor.  Her voice is indicative of a vocal cord paralysis

subsequent to the cervical spine surgery.  Motor tone is otherwise normal.  No

tremors or asterixis are present.  She has no nystagmus noted.  Cranial nerves

are intact.



IMPRESSION:

1.  History of dementia.  Reviewing her CT scan, there is mild-to-moderate

atrophy, which is slightly more enhanced that I would expect to see at age 63. 

I do not have a good family history as the background of this.

2.  Past history of surgery of the neck.  I did check lateral films on her CT

scan.  I did not see any gross destruction of the hardware or plate and screws

were all intact at the vertebral body spaces.

3.  History of severe vertigo, etiology undetermined.

4.  Hypercalcemia.



PLAN:  We will check parathyroid level.  PEG tube issue, I am not entirely clear

as to why this was placed.  History would indicate initially it was in [place].  I do not have much update on this.  GI will be consulted to further

assess this.  Recommend treating her hypertension as her blood pressure is

164/82 and we will further follow the patient with you.





DD: 01/21/2019 08:22

DT: 01/21/2019 11:04

JOB# [id number]  [id number]

STEPHANIE/NTS

## 2019-01-21 NOTE — GASTROENTEROLOGY CONSULTATION
<ROSSY BARTHOLOMEW - Last Filed: 01/21/19 13:21>





History of Present Illness





- Reason for Consult


Consult date: 01/21/19


PEG malfunction?, constipation


Requesting physician: LUCÍA CUEVAS





- History of Present Illness


Patient is a 62 y/o female with PMH of DM, HTN, tobacco dependency, OA on 

chronic pain medication, constipation, and dementia? (s/p PEG placement for poor

appetite last year in [place]) who was admitted for N/V which has since 

resolved, syncope, AMS, and hypercalcemia. GI has been consulted for PEG 

malfunction? and constipation. This morning patient was resting in bed w/o acute

distress. Unable to provide history. History obtained via chart review (no 

family at bedside). No evidence of abd pain or N/V. Patient states last BM was 

approximately 2 weeks ago. Abd CT negative. Upon exam, PEG easily flushed and 

site w/o s/s of infection. Currently tolerating TFs via PEG. 








Past History


Past Medical History: other (as per HPI)


Past Surgical History: Other (s/p PEG)


Social history: smoking


Family history: other (unknown)





Medications and Allergies


                                    Allergies











Allergy/AdvReac Type Severity Reaction Status Date / Time


 


acetaminophen Allergy  Hives Verified 01/04/19 19:19





[From Darvocet-N 100]     


 


ketorolac [From Toradol] Allergy  Hives Verified 01/04/19 19:19


 


propoxyphene Allergy  Hives Verified 01/04/19 19:19





[From Darvocet-N 100]     











                                Home Medications











 Medication  Instructions  Recorded  Confirmed  Last Taken  Type


 


AtorvaSTATin QHS 01/19/19  Unknown History


 


Cyclobenzaprine TID 01/19/19  Unknown History


 


Doxepin 100 mg PO QHS 01/19/19 01/19/19 Unknown History


 


Ferrous Sulfate 325 mg PO BID 01/19/19 01/19/19 Unknown History


 


Fish Oil BID 01/19/19  Unknown History


 


Ranitidine HCl 150 mg PO BID 01/19/19 01/19/19 Unknown History


 


Sensipar 60 mg PO QDAY 01/19/19 01/19/19 Unknown History


 


Symproic 9.2 mg PO QDAY 01/19/19 01/19/19 Unknown History


 


Topiramate 100 mg PO BID 01/19/19 01/19/19 Unknown History


 


oxyCODONE /ACETAMINOPHEN 7.5 mg 01/19/19  Unknown History











Active Meds: 


Active Medications





Lipase/Protease/Amylase (Pancrejacey Yoon 10,500 Unit)  1 each FEEDTUBE PRN PRN


   PRN Reason: For Clogged Feeding Tube


Azithromycin (Zithromax)  500 mg PO QDAY ADDI


Bisacodyl (Dulcolax)  10 mg VA QDAY PRN


   PRN Reason: Constipation


Dextrose (D50w (25gm) Syringe)  50 ml IV PRN PRN


   PRN Reason: Hypoglycemia


   Last Admin: 01/20/19 18:11 Dose:  50 ml


   Documented by: 


Doxepin HCl (Sinequan)  100 mg PO QHS [facility]


   Last Admin: 01/20/19 22:42 Dose:  100 mg


   Documented by: 


Heparin Sodium (Porcine) (Heparin)  5,000 unit SUB-Q Q12HR [facility]


   Last Admin: 01/21/19 09:32 Dose:  5,000 unit


   Documented by: 


Ceftriaxone Sodium (Rocephin/Ns 2 Gm/100 Ml)  2 gm in 100 mls @ 200 mls/hr IV 

Q24H [facility]; Protocol


   Last Admin: 01/20/19 22:48 Dose:  200 mls/hr


   Documented by: 


Insulin Human Lispro (Humalog)  0 unit SUB-Q Q6HR [facility]; Protocol


   Last Admin: 01/21/19 06:34 Dose:  Not Given


   Documented by: 


Lansoprazole (Prevacid Solutab)  30 mg FEEDTUBE QDAY ADDI


Ondansetron HCl (Zofran)  4 mg IV Q4H PRN


   PRN Reason: Nausea And Vomiting


Oxycodone/Acetaminophen (Percocet 5/325)  1 tab PO Q4H PRN


   PRN Reason: Pain , Severe (7-10)


   Last Admin: 01/21/19 06:17 Dose:  1 tab


   Documented by: 


Simple Syrup (Simple Syrup)  15 ml FEEDTUBE PRN PRN


   PRN Reason: Hypoglycemia


Simple Syrup (Simple Syrup)  30 ml FEEDTUBE PRN PRN


   PRN Reason: Hypoglycemia


Sodium Bicarbonate (Sodium Bicarbonate)  325 mg FEEDTUBE PRN PRN


   PRN Reason: For Clogged Feeding Tube


Topiramate (Topamax)  100 mg PO BID [facility]


   Last Admin: 01/21/19 09:32 Dose:  100 mg


   Documented by: 





medications reviewed/updated as required





Review of Systems





- Review of Systems


ROS unobtainable: due to mental status





Exam





- Constitutional


Vital Signs: 


                                        











Temp Pulse Resp BP Pulse Ox


 


 98.0 F   110 H  20   123/66   99 


 


 01/21/19 07:46  01/21/19 07:46  01/21/19 07:46  01/21/19 07:46  01/21/19 07:46











General appearance: no acute distress





- Respiratory


Respiratory: bilateral: CTA





- Cardiovascular


Rhythm: other (tachycardia)





- Gastrointestinal


General gastrointestinal: Present: soft, non-tender, non-distended, normal bowel

sounds, other (+PEG (no redness, swelling, odor, or drainage-scant amount of 

blood noted at site on dressing))





- Labs


CBC & Chem 7: 


                                 01/20/19 07:10





                                 01/20/19 07:10


Lab Results: 


                         Laboratory Results - last 24 hr











  01/20/19 01/20/19 01/20/19





  17:32 17:39 18:06


 


POC Glucose  57 L  57 L  89














  01/20/19 01/20/19 01/21/19





  20:58 23:16 06:30


 


POC Glucose  52 L  40 L  114 H














  01/21/19





  12:03


 


POC Glucose  123 H














Assessment and Plan


1.malfunctioning PEG?


-abd CT w/o acute findings (showed PEG in stomach)


-upon exam, PEG flushed w/o difficultly or leaking and site w/o s/s of infection


-currently tolerating TFs w/o evidence of abd pain or N/V


-no plans for PEG replacement at this time


-okay to continue TFs


2.constipation


-likely 2/2 chronic narcotic use


-continue dulcolax supp PRN


-start on Miralax


-continue supportive care











<MIKE HANEY - Last Filed: 01/21/19 19:09>





Medications and Allergies


Active Meds: 


Active Medications





Lipase/Protease/Amylase (Pancreaze Dr 10,500 Unit)  1 each FEEDTUBE PRN PRN


   PRN Reason: For Clogged Feeding Tube


Azithromycin (Zithromax)  500 mg PO QDAY ADDI


   Last Admin: 01/21/19 15:10 Dose:  500 mg


   Documented by: 


Bisacodyl (Dulcolax)  10 mg VA QDAY PRN


   PRN Reason: Constipation


Dextrose (D50w (25gm) Syringe)  50 ml IV PRN PRN


   PRN Reason: Hypoglycemia


   Last Admin: 01/20/19 18:11 Dose:  50 ml


   Documented by: 


Doxepin HCl (Sinequan)  100 mg PO QHS [facility]


   Last Admin: 01/20/19 22:42 Dose:  100 mg


   Documented by: 


Heparin Sodium (Porcine) (Heparin)  5,000 unit SUB-Q Q12HR [facility]


   Last Admin: 01/21/19 09:32 Dose:  5,000 unit


   Documented by: 


Ceftriaxone Sodium (Rocephin/Ns 2 Gm/100 Ml)  2 gm in 100 mls @ 200 mls/hr IV 

Q24H [facility]; Protocol


   Last Admin: 01/20/19 22:48 Dose:  200 mls/hr


   Documented by: 


Insulin Human Lispro (Humalog)  0 unit SUB-Q Q6HR [facility]; Protocol


   Last Admin: 01/21/19 14:47 Dose:  Not Given


   Documented by: 


Lansoprazole (Prevacid Solutab)  30 mg FEEDTUBE QDAY [facility]


Ondansetron HCl (Zofran)  4 mg IV Q4H PRN


   PRN Reason: Nausea And Vomiting


Oxycodone/Acetaminophen (Percocet 5/325)  1 tab PO Q4H PRN


   PRN Reason: Pain , Severe (7-10)


   Last Admin: 01/21/19 15:10 Dose:  1 tab


   Documented by: 


Polyethylene Glycol (Miralax 3350)  17 gm PO QDAY [facility]


   Last Admin: 01/21/19 15:14 Dose:  17 gm


   Documented by: 


Simple Syrup (Simple Syrup)  15 ml FEEDTUBE PRN PRN


   PRN Reason: Hypoglycemia


Simple Syrup (Simple Syrup)  30 ml FEEDTUBE PRN PRN


   PRN Reason: Hypoglycemia


Sodium Bicarbonate (Sodium Bicarbonate)  325 mg FEEDTUBE PRN PRN


   PRN Reason: For Clogged Feeding Tube


Topiramate (Topamax)  100 mg PO BID [facility]


   Last Admin: 01/21/19 09:32 Dose:  100 mg


   Documented by: 











Exam





- Constitutional


Vital Signs: 


                                        











Temp Pulse Resp BP Pulse Ox


 


 98.0 F   110 H  20   123/66   99 


 


 01/21/19 07:46  01/21/19 07:46  01/21/19 07:46  01/21/19 07:46  01/21/19 07:46














- Labs


CBC & Chem 7: 


                                 01/20/19 07:10





                                 01/20/19 07:10


Lab Results: 


                         Laboratory Results - last 24 hr











  01/20/19 01/20/19 01/20/19





  17:32 17:39 18:06


 


POC Glucose  57 L  57 L  89














  01/20/19 01/20/19 01/21/19





  20:58 23:16 06:30


 


POC Glucose  52 L  40 L  114 H














  01/21/19





  12:03


 


POC Glucose  123 H














Assessment and Plan


Patient seen and examined.  Agree with note above.  PEG appears to be 

functioning and seems to be using it at this time.  Cont plan as above.

## 2019-01-21 NOTE — PROGRESS NOTE
Assessment and Plan


Assessment and plan: 


Patient is a 62 yo woman with a history of type 2 DM, hypertension, tobacco 

dependency, OA on chronic pain medication on percocet for years, constipation 

and PEG tube placement who presented to [facility] ED with c/o hot feeling over her 

back going through her neck, feeling weak and can't hold anything by mouth with 

N/V. She was given Meclizine in ED and was going to be discharge; however, she 

had a syncope episode in ED, but no details listed. Looking back at prior 

records, she was just here ([facility] ED) on 2019 for PEG issues and it seems she

was discharged home on Bactrim and narcotics (reason for the Bactrim or if 

patient even taking is unknown). Patient is extremely poor historian, she 

doesn't know why she has a PEG. She hasn't been using PEG tube feeding because 

no MD follow up established yet. I called daughter Tahira and her phone is off 

then called granddaugher Allison at [phone] and spoke with her, patient 

lives with granddaughter, lives in [place], moved here 2-3 month ago. 

Then Allison handed the phone to her mother, Tahira.  PEG last year in [place] 

[place], because lost appetite==>Alzheimer Dementia related most likely but 

never diagnosis. Patient actually was at her daughter Addie's house, 

[phone], yesterday, [facility] is where PEG was place. I have asked Tahira to come and sign 

medical release form. 





* CT head wo contrast Impression: There is no evidence of an acute intracranial 

  process


* pCXR Impression: No acute cardiopulmonary abnormality





-Abd pains/N/V, ?colitis: get CT abd/pelvis with contrast, she had ct ab/pelvis 

without contrast on 19, use PEG tube instead of oral feeding. 


-Syncope, appears vasovagal: Ordered ECHO, carotid doppler, neurochecks


-AMS, acute metabolic encephalopathy most likely vs stroke vs dementia: get MRI 

brain, consulted and spoke with Dr. Carlson


-Type 2 DM, diet control because of hypoglycemia off lantus/insulin: treat with 

ssi


-Tobacco dependency:  on stopping


-PEG due to malnutriton and weight loss: get cT abd/pelvis, GI consult


-Hypercalcemia, daughter told me she has this for years, she had colonoscopy and

she had a blockage(?) and chronic constipation: get ct ab/pelvis with contrast, 

we did discuss probably cancer related


-Elevated D-Dimer no PE on cTA chest


-DVT prophylaxis: sq heparin


-Home reconciliation not done, home meds at [facility]'s home: RN to call [facility] and

get list of home medication so that I may reconcile home meds


full code





Patient unable to get CTA chest/abd/pelvis because no peripheral IV line, so i 

consulted Dr. Pinedo and moved her to [facility] for line placement. Spoke with nurse

René to call CT department and she can go back to tele after testing. Patient 

back on Telemetry


MRI pending to evaluate the Vertigo per Neurology, if negative will discharge. 

CTA chest/abd/pelvis unremarkable.   





History


Interval history: 


Patient was seen and examined. Follow-up on current diagnosis of abd pains, 

tolerating percocet. Overnight uneventful. Patient denies any chest pain, 

shortness breath, nausea/vomiting or severe headaches. Imaging, nursing note, 

chart, labs and old chart reviewed. Discussed with patient. Tolerating tube 

feedings





Hospitalist Physical





- Physical exam


Narrative exam: 


Gen: WDWN, unkempt hair, red dread locks with bilateral temple alopecia with a 

purple shower cap on, NAD, Awake, Alert, Orientated x 3


HEENT: NCAT, EOMI, PERRL, OP Clear 


Neck: supple, no adenopathy, no thyromegaly, no JVD 


CVS/Heart: RRR, normal S1S2, pulses present bilaterally 


Chest/Lungs: CTA B, Symmetrical chest expansion, good air entry bilaterally 


GI/Abdomen: soft, diffuse tenderness, peg in place, good bowel sounds, no 

guarding or rebound 


/Bladder: no suprapubic tenderness, no CVA or paraspinal tenderness 


Extermity/Skin: no c/c/e, no obvious rash 


MSK: FROM x 4 


Neuro: CN 2-12 grossly intact, no new focal deficits 


Psych: calm but poor memory recall.








- Constitutional


Vitals: 


                                        











Temp Pulse Resp BP Pulse Ox


 


 98.0 F   110 H  20   123/66   99 


 


 19 07:46  19 07:46  19 07:46  19 07:46  19 07:46














Results





- Labs


CBC & Chem 7: 


                                 19 07:10





                                 19 07:10


Labs: 


                             Laboratory Last Values











WBC  6.4 K/mm3 (4.5-11.0)   19  07:10    


 


RBC  4.36 M/mm3 (3.65-5.03)   19  07:10    


 


Hgb  12.9 gm/dl (10.1-14.3)   19  07:10    


 


Hct  39.7 % (30.3-42.9)   19  07:10    


 


MCV  91 fl (79-97)   19  07:10    


 


MCH  30 pg (28-32)   19  07:10    


 


MCHC  33 % (30-34)   19  07:10    


 


RDW  13.1 % (13.2-15.2)  L  19  07:10    


 


Plt Count  252 K/mm3 (140-440)   19  07:10    


 


Lymph % (Auto)  35.0 % (13.4-35.0)   19  23:13    


 


Mono % (Auto)  6.7 % (0.0-7.3)   19  23:13    


 


Eos % (Auto)  0.9 % (0.0-4.3)   19  23:13    


 


Baso % (Auto)  1.0 % (0.0-1.8)   19  23:13    


 


Lymph #  2.8 K/mm3 (1.2-5.4)   19  23:13    


 


Mono #  0.5 K/mm3 (0.0-0.8)   19  23:13    


 


Eos #  0.1 K/mm3 (0.0-0.4)   19  23:13    


 


Baso #  0.1 K/mm3 (0.0-0.1)   19  23:13    


 


Seg Neutrophils %  56.4 % (40.0-70.0)   19  23:13    


 


Seg Neutrophils #  4.4 K/mm3 (1.8-7.7)   19  23:13    


 


D-Dimer  2420.43 ng/mlDDU (0-234)  H  19  06:57    


 


Sodium  144 mmol/L (137-145)  D 19  07:10    


 


Potassium  3.8 mmol/L (3.6-5.0)   19  07:10    


 


Chloride  105.5 mmol/L ()   19  07:10    


 


Carbon Dioxide  26 mmol/L (22-30)   19  07:10    


 


Anion Gap  16 mmol/L  19  07:10    


 


BUN  5 mg/dL (7-17)  L  19  07:10    


 


Creatinine  1.2 mg/dL (0.7-1.2)   19  07:10    


 


Estimated GFR  55 ml/min  19  07:10    


 


BUN/Creatinine Ratio  4 %  19  07:10    


 


Glucose  60 mg/dL ()  L  19  07:10    


 


POC Glucose  123  ()  H  19  12:03    


 


Lactic Acid  1.00 mmol/L (0.7-2.0)   19  06:57    


 


Calcium  10.9 mg/dL (8.4-10.2)  H  19  07:10    


 


Total Bilirubin  0.40 mg/dL (0.1-1.2)   19  23:13    


 


AST  14 units/L (5-40)   19  23:13    


 


ALT  15 units/L (7-56)   19  23:13    


 


Alkaline Phosphatase  35 units/L ()   19  23:13    


 


Total Creatine Kinase  67 units/L ()   19  21:08    


 


Troponin T  < 0.010 ng/mL (0.00-0.029)   19  23:13    


 


Total Protein  6.8 g/dL (6.3-8.2)   19  23:13    


 


Albumin  4.8 g/dL (3.9-5)   19  23:13    


 


Albumin/Globulin Ratio  2.4 %  19  23:13    


 


PTH Intact  67.17 pg/mL (15-65)  H  19  21:08    














Nutrition/Malnutrition Assess





- Dietary Evaluation


Nutrition/Malnutrition Findings: 


                                        





Nutrition Notes                                            Start:  19 

16:40


Freq:                                                      Status: Active       




Protocol:                                                                       




 Document     19 11:00  DELMI  (Rec: 19 11:06  DELMI  SRW-

[id number])


 Nutrition Notes


     Need for Assessment generated from:         MD Order


     Initial or Follow up                        Assessment


     Other Pertinent Diagnosis                   Generalized weakness


     Current Diet                                NPO


     Labs/Tests                                  BG 60


                                                 Ca 10.9


     Pertinent Medications                       Reviewed


     Height                                      5 ft 1 in


     Weight                                      69.1 kg


     Ideal Body Weight (lbs)                     105.0


     BMI                                         28.8


     Subjective/Other Information                RD consulted for TF.  Unable


                                                 to access MD notes at this


                                                 time for PMHx.  Pt scheduled


                                                 for testing this am.


     Burn                                        Absent


     Trauma                                      Absent


     #1


      Nutrition Diagnosis                        Inadequate oral intake


      Etiology                                   ?


      As Evidenced by Signs and Symptoms         pt NPO


     Is patient on ventilator?                   No


     Is Patient Ambulatory and/or Out of Bed     No


     REE-([place]-confined to bed)      1413.456


     Calculation Used for Recommendations        [facility]


     Additional Notes                            Pro needs 1-1.2g/k-83g/


                                                 day


                                                 Fluid needs per MD.


 Nutrition Intervention


     Nutrition Support:                          Jevity 1.2 at 50ml/hr with


                                                 80ml water flush q4h.


     Kcal                                        1,440


     Protein (gm)                                67


     Fluid (mL)                                  968


     Fiber (gm)                                  22


     Goal #1                                     TF tolerance


     Goal #2                                     TF to meet at least 75% energy


                                                 and pro needs


     Anticipated Discharge Needs:                Continue TF if necessary


     Follow-Up By:                               19


     Additional Comments                         F/U: new TF, MST assessment

## 2019-01-21 NOTE — MAGNETIC RESONANCE REPORT
FINAL REPORT



PROCEDURE:  Magnetic resonance angiogram brain without contrast. 



TECHNIQUE:  Axial 3-D time-of-flight MR angiography of the [place] and brain was performed. 
The source images were reconstructed in various views using maximum intensity projection. 



HISTORY:  Stroke-like symptoms. 



COMPARISON:  No prior studies are available for comparison.



FINDINGS:  

Both distal internal carotid arteries are patent. Both anterior cerebral arteries are patent. The ant
erior communicating artery is patent. Both middle cerebral arteries are patent. The posterior communi
cating arteries are not visualized. Both distal vertebral arteries are patent. Both posterior inferio
r cerebellar arteries are patent. The basilar artery is patent. The anterior inferior cerebellar murali
kumar are not visualized. Both superior cerebellar and both posterior cerebral arteries are patent. Th
ere are no signs of aneurysm disease. There is no evidence of a vasculitis. 



IMPRESSION:  

No significant abnormality.

## 2019-01-21 NOTE — PROGRESS NOTE
Subjective


Date of service: 01/21/19


Interval history: 


vertigo is better plan MRI today to asess  the parathyroid is abnormal likely 

cause of elevated calcium 








Objective





- Vital Sign


                               Vital Signs - 12hr











  01/20/19 01/20/19 01/21/19





  21:45 23:06 06:23


 


Temperature 97.8 F 97.6 F 97.5 F L


 


Pulse Rate 99 H 101 H 185 H


 


Respiratory 18 20 20





Rate   


 


Blood Pressure 149/81 145/73 146/76


 


O2 Sat by Pulse 100 100 86





Oximetry   














- Laboratory Findings


CBC and BMP: 


                                 01/20/19 07:10





                                 01/20/19 07:10


Abnormal Lab Findings: 


                                  Abnormal Labs











  01/18/19 01/19/19 01/19/19





  23:13 06:57 21:08


 


RDW   


 


D-Dimer   2420.43 H 


 


Carbon Dioxide  20 L  


 


BUN   


 


Glucose  115 H  


 


POC Glucose   


 


Calcium  11.9 H  


 


PTH Intact    67.17 H














  01/20/19 01/20/19 01/20/19





  00:16 07:10 07:10


 


RDW   13.1 L 


 


D-Dimer   


 


Carbon Dioxide   


 


BUN    5 L


 


Glucose    60 L


 


POC Glucose  108 H  


 


Calcium    10.9 H


 


PTH Intact   














  01/20/19 01/20/19 01/20/19





  17:32 17:39 20:58


 


RDW   


 


D-Dimer   


 


Carbon Dioxide   


 


BUN   


 


Glucose   


 


POC Glucose  57 L  57 L  52 L


 


Calcium   


 


PTH Intact   














  01/20/19





  23:16


 


RDW 


 


D-Dimer 


 


Carbon Dioxide 


 


BUN 


 


Glucose 


 


POC Glucose  40 L


 


Calcium 


 


PTH Intact

## 2019-01-21 NOTE — MAGNETIC RESONANCE REPORT
FINAL REPORT



PROCEDURE:  MRI brain without contrast. 



TECHNIQUE:  Magnetic resonance imaging of the brain was performed without contrast material. 



HISTORY:  Seizure. 



COMPARISON:  CT head 01/19/2019.



FINDINGS:  

The ventricles are normal in size. There is normal signal intensity from the gray matter and white ma
tter. There are no mass lesions. There is no intracranial hemorrhage. There are no signs of restricte
d diffusion. The mastoid air cells are clear. There is mucosal thickening in the left maxillary sinus
. 



IMPRESSION:  

Normal study of the brain. Chronic left maxillary sinusitis.

## 2019-01-22 RX ADMIN — OXYCODONE AND ACETAMINOPHEN PRN TAB: 5; 325 TABLET ORAL at 17:00

## 2019-01-22 RX ADMIN — TOPIRAMATE SCH MG: 100 TABLET, FILM COATED ORAL at 21:51

## 2019-01-22 RX ADMIN — HEPARIN SODIUM SCH UNIT: 5000 INJECTION, SOLUTION INTRAVENOUS; SUBCUTANEOUS at 21:50

## 2019-01-22 RX ADMIN — OXYCODONE AND ACETAMINOPHEN PRN TAB: 5; 325 TABLET ORAL at 08:10

## 2019-01-22 RX ADMIN — OXYCODONE AND ACETAMINOPHEN PRN TAB: 5; 325 TABLET ORAL at 12:51

## 2019-01-22 RX ADMIN — CEFTRIAXONE SODIUM SCH MLS/HR: 2 INJECTION, POWDER, FOR SOLUTION INTRAMUSCULAR; INTRAVENOUS at 21:49

## 2019-01-22 RX ADMIN — TOPIRAMATE SCH MG: 100 TABLET, FILM COATED ORAL at 10:10

## 2019-01-22 RX ADMIN — POLYETHYLENE GLYCOL 3350 SCH GM: 17 POWDER, FOR SOLUTION ORAL at 21:50

## 2019-01-22 RX ADMIN — HEPARIN SODIUM SCH UNIT: 5000 INJECTION, SOLUTION INTRAVENOUS; SUBCUTANEOUS at 09:56

## 2019-01-22 RX ADMIN — LANSOPRAZOLE SCH MG: 30 TABLET, ORALLY DISINTEGRATING, DELAYED RELEASE ORAL at 09:56

## 2019-01-22 RX ADMIN — AZITHROMYCIN SCH MG: 250 TABLET, FILM COATED ORAL at 09:56

## 2019-01-22 RX ADMIN — POLYETHYLENE GLYCOL 3350 SCH GM: 17 POWDER, FOR SOLUTION ORAL at 09:55

## 2019-01-22 RX ADMIN — OXYCODONE AND ACETAMINOPHEN PRN TAB: 5; 325 TABLET ORAL at 21:51

## 2019-01-22 NOTE — PROGRESS NOTE
Subjective


Date of service: 01/22/19


Principal diagnosis: malfunctioning PEG/constipation


Interval history: 


MRI OF THE BRAIN SHOWS ONLY ATROPHY FROM DEMENTIA  NO ACUTE STROKE  OK TO 

DISCHARGE THE PATIENT








Objective





- Vital Sign


                               Vital Signs - 12hr











  01/22/19 01/22/19 01/22/19





  07:58 08:00 11:12


 


Temperature 98.4 F  98.4 F


 


Pulse Rate 114 H 114 H 107 H


 


Respiratory 20  20





Rate   


 


Blood Pressure 112/57  124/58


 


O2 Sat by Pulse 99  100





Oximetry   














- Laboratory Findings


CBC and BMP: 


                                 01/20/19 07:10





                                 01/20/19 07:10


Abnormal Lab Findings: 


                                  Abnormal Labs











  01/18/19 01/19/19 01/19/19





  23:13 06:57 21:08


 


RDW   


 


D-Dimer   2420.43 H 


 


Carbon Dioxide  20 L  


 


BUN   


 


Glucose  115 H  


 


POC Glucose   


 


Calcium  11.9 H  


 


PTH Intact    67.17 H














  01/20/19 01/20/19 01/20/19





  00:16 07:10 07:10


 


RDW   13.1 L 


 


D-Dimer   


 


Carbon Dioxide   


 


BUN    5 L


 


Glucose    60 L


 


POC Glucose  108 H  


 


Calcium    10.9 H


 


PTH Intact   














  01/20/19 01/20/19 01/20/19





  17:32 17:39 20:58


 


RDW   


 


D-Dimer   


 


Carbon Dioxide   


 


BUN   


 


Glucose   


 


POC Glucose  57 L  57 L  52 L


 


Calcium   


 


PTH Intact   














  01/20/19 01/21/19 01/21/19





  23:16 06:30 12:03


 


RDW   


 


D-Dimer   


 


Carbon Dioxide   


 


BUN   


 


Glucose   


 


POC Glucose  40 L  114 H  123 H


 


Calcium   


 


PTH Intact   














  01/21/19 01/22/19





  23:47 06:41


 


RDW  


 


D-Dimer  


 


Carbon Dioxide  


 


BUN  


 


Glucose  


 


POC Glucose  227 H  193 H


 


Calcium  


 


PTH Intact

## 2019-01-22 NOTE — DISCHARGE SUMMARY
Providers





- Providers


Date of Admission: 


01/19/19 08:28





Date of discharge: 01/23/19


Attending physician: 


LUCÍA CUEVAS





                                        





01/19/19 15:24


Consult to Physician [CONS] Routine 


   Comment: 


   Consulting Provider: CHATA CHANDRA


   Physician Instructions: 


   Reason For Exam: AMS, ?dementia and chronic pain syndrome





01/19/19 15:28


Consult to Physician [CONS] Routine 


   Comment: 


   Consulting Provider: RANDELL PALOMO


   Physician Instructions: 


   Reason For Exam: peg malfunction/?need, constipation,





01/19/19 15:31


Consult to Dietitian/Nutrition [CONS] Routine 


   Physician Instructions: 


   Reason For Exam: 


   Reason for Consult: Write/Manage Tube Feeding





01/20/19 08:20


Consult to Physician [CONS] Routine 


   Comment: 


   Consulting Provider: DEANNE STEIN


   Physician Instructions: 


   Reason For Exam: iv line placement for CTA chest














Hospitalization


Condition: Stable


Hospital course: 


Patient is a 62 yo woman with a history of type 2 DM, hypertension, tobacco 

dependency, OA on chronic pain medication on percocet for years, constipation 

and PEG tube placement who presented to [facility] ED with c/o hot feeling over her 

back going through her neck, feeling weak and can't hold anything by mouth with 

N/V. She was given Meclizine in ED and was going to be discharge; however, she 

had a syncope episode in ED, but no details listed. Looking back at prior 

records, she was just here ([facility] ED) on 1/4/2019 for PEG issues and it seems she

 was discharged home on Bactrim and narcotics (reason for the Bactrim or if 

patient even taking is unknown). Patient is extremely poor historian, she 

doesn't know why she has a PEG. She hasn't been using PEG tube feeding because 

no MD follow up established yet. I called daughter Tahira and her phone is off 

then called granddaugher Rohinicassandraabdi at [phone] and spoke with her, patient 

lives with granddaughter, lives in [place], moved here 2-3 month ago. 

Then Allison handed the phone to her mother, Tahira.  PEG last year in [place] 

[place], because lost appetite==>Alzheimer Dementia related most likely but 

never diagnosis. Patient actually was at her daughter Addie's house, 

[phone], yesterday, [facility]. I have asked Tahira to come and sign 

medical release form. 





* CT head wo contrast Impression: There is no evidence of an acute intracranial 

  process


* pCXR Impression: No acute cardiopulmonary abnormality


* MRI brain wo contrast, no acute intracranial problems, chronic left maxillary 

  sinusitis


* CTA chest/abd/pelvis unremarkable





-Abd pains/N/V, gastritis most likely: get CT abd/pelvis with contrast, she had 

ct ab/pelvis without contrast on 1/4/19, use PEG tube instead of oral feeding. 


-Nausea/Vomiting: use PEG feeding


-Vertigo


-Left chronic maxillary sinsusitis, pt was treat with azithromycin, will refer 

to ENT


-Syncope, appears vasovagal: Ordered ECHO, carotid doppler, neurochecks


-AMS, acute metabolic encephalopathy most likely dehydration from n/v, vertigo


-Type 2 DM, diet control because of hypoglycemia off lantus/insulin: treat with 

ssi


-Tobacco dependency:  on stopping


-PEG due to malnutriton and weight loss: get cT abd/pelvis, GI consult


-Hypercalcemia, daughter told me she has this for years, she had colonoscopy and

 she had a blockage(?) and chronic constipation: get ct ab/pelvis with contrast,

 we did discuss probably cancer related


-Elevated D-Dimer no PE on cTA chest


-DVT prophylaxis: sq heparin


full code





home once tube feeding setup





Disposition: DC/TX-06 HOME UNDER HOME HL


Time spent for discharge: 34 minutes





Core Measure Documentation





- Palliative Care


Palliative Care/ Comfort Measures: Not Applicable





- Core Measures


Any of the following diagnoses?: none





- VTE Discharge Requirements


Deep Vein Thrombosis/Pulmonary Embolism Present on Admission: No


Has pt received <5 days of overlap therapy or INR<2.0: No


Anticoagulant overlap therapy prescribed at discharge: No


Contraindication No Overlap Therapy order at DC: Not Indicated





Exam





- Physical Exam


Narrative exam: 


Gen: WDWN, NAD, Awake, Alert, Orientated x 3


HEENT: NCAT, EOMI, PERRL, OP Clear 


Neck: supple, no adenopathy, no thyromegaly, no JVD 


CVS/Heart: RRR, normal S1S2, pulses present bilaterally 


Chest/Lungs: CTA B, Symmetrical chest expansion, good air entry bilaterally 


GI/Abdomen: soft, diffuse tenderness, peg in place, good bowel sounds, no 

guarding or rebound 


/Bladder: no suprapubic tenderness, no CVA or paraspinal tenderness 


Extermity/Skin: no c/c/e, no obvious rash 


MSK: FROM x 4 


Neuro: CN 2-12 grossly intact, no new focal deficits 


Psych: calm but poor memory recall.








- Constitutional


Vitals: 


                                        











Temp Pulse Resp BP Pulse Ox


 


 98.4 F   114 H  20   112/57   99 


 


 01/22/19 07:58  01/22/19 08:00  01/22/19 07:58  01/22/19 07:58  01/22/19 07:58














Plan


Activity: up only with assistance, fall precautions, other (no strenous activity

 until cleared by PCP)


Diet: per dietitian instruction


Special Instructions: other (Tube feeding)


Follow up with: 


AMADO VILLARREAL [Other] - 3-5 Days


KHOI RODAS MD [Staff Physician] - 7 Days


CHATA CHANDRA MD [Staff Physician] - 7 Days


RANDELL PALOMO MD [Staff Physician] - 7 Days


Prescriptions: 


AtorvaSTATin 40 mg PO QHS #30


Doxepin 100 mg PO QHS #30


Lansoprazole Solutab [Prevacid Solutab] 30 mg FEEDTUBE QDAY #30 tab.rapdis


Lipase/Protease/Amylase [Pancreaze Dr 10,500 Unit] 1 each FEEDTUBE PRN PRN #30 

capsule


 PRN Reason: For Clogged Feeding Tube


oxyCODONE /ACETAMINOPHEN [Percocet 5/325 mg] 1 tab PO Q4H PRN #15 tablet


 PRN Reason: Pain , Severe (7-10)


Polyethylene Glycol 3350 [Miralax 3350] 17 gm PO QDAY PRN #30 powd.pack


 PRN Reason: Constipation


Ranitidine HCl 150 mg PO BID #15


Simple Syrup 15 ml FEEDTUBE PRN PRN #30 oral.liqd


 PRN Reason: Hypoglycemia


Simple Syrup 30 ml FEEDTUBE PRN PRN #30 oral.liqd


 PRN Reason: Hypoglycemia


Sodium Bicarbonate 325 mg FEEDTUBE PRN PRN #15 tablet


 PRN Reason: For Clogged Feeding Tube


Topiramate 100 mg PO BID #60

## 2019-01-23 VITALS — DIASTOLIC BLOOD PRESSURE: 63 MMHG | SYSTOLIC BLOOD PRESSURE: 142 MMHG

## 2019-01-23 RX ADMIN — POLYETHYLENE GLYCOL 3350 SCH GM: 17 POWDER, FOR SOLUTION ORAL at 09:46

## 2019-01-23 RX ADMIN — HEPARIN SODIUM SCH UNIT: 5000 INJECTION, SOLUTION INTRAVENOUS; SUBCUTANEOUS at 12:43

## 2019-01-23 RX ADMIN — OXYCODONE AND ACETAMINOPHEN PRN TAB: 5; 325 TABLET ORAL at 04:38

## 2019-01-23 RX ADMIN — OXYCODONE AND ACETAMINOPHEN PRN TAB: 5; 325 TABLET ORAL at 12:35

## 2019-01-23 RX ADMIN — TOPIRAMATE SCH MG: 100 TABLET, FILM COATED ORAL at 09:46

## 2019-01-23 RX ADMIN — LANSOPRAZOLE SCH MG: 30 TABLET, ORALLY DISINTEGRATING, DELAYED RELEASE ORAL at 09:46

## 2019-01-23 RX ADMIN — AZITHROMYCIN SCH MG: 250 TABLET, FILM COATED ORAL at 09:46

## 2019-01-23 NOTE — PROGRESS NOTE
Assessment and Plan


Assessment and plan: 


Patient is a 62 yo woman with a history of type 2 DM, hypertension, tobacco 

dependency, OA on chronic pain medication on percocet for years, constipation 

and PEG tube placement who presented to [facility] ED with c/o hot feeling over her 

back going through her neck, feeling weak and can't hold anything by mouth with 

N/V. She was given Meclizine in ED and was going to be discharge; however, she 

had a syncope episode in ED, but no details listed. Looking back at prior 

records, she was just here ([facility] ED) on 2019 for PEG issues and it seems she

was discharged home on Bactrim and narcotics (reason for the Bactrim or if 

patient even taking is unknown). Patient is extremely poor historian, she 

doesn't know why she has a PEG. She hasn't been using PEG tube feeding because 

no MD follow up established yet. I called daughter Tahira and her phone is off 

then called granddaugher Allison at [phone] and spoke with Tahira, patient 

lives with granddaughter, lives in [place], moved here 2-3 month ago. 

Then Allison handed the phone to her mother, Tahira.  PEG last year in [place] 

[place], because lost appetite==>Alzheimer Dementia related most likely but 

never diagnosis. Patient actually was at her daughter Addie's house, 

[phone], yesterday, [facility] in [place], [place] [facility] is where PEG was place. I have asked Tahira to come and sign 

medical release form. 





* CT head wo contrast Impression: There is no evidence of an acute intracranial 

  process


* pCXR Impression: No acute cardiopulmonary abnormality





-Abd pains/N/V, ?colitis: get CT abd/pelvis with contrast, she had ct ab/pelvis 

without contrast on 19, use PEG tube instead of oral feeding. 


-Syncope, appears vasovagal: Ordered ECHO, carotid doppler, 

neurochecks==>reviewed


-AMS, acute metabolic encephalopathy most likely vs stroke vs dementia: get MRI 

brain, consulted and spoke with Dr. Carlson


-Type 2 DM, diet control because of hypoglycemia off lantus/insulin: treat with 

ssi


-Tobacco dependency:  on stopping


-PEG due to malnutriton and weight loss: get cT abd/pelvis, GI consulted


-Constipated, narcotics doesn't help: on miralax bid and dulcolax pr prn per GI


-Hypercalcemia with chronic constipation, daughter told me she has this for 

years, she had colonoscopy and she had a blockage(?), very mildly elevated PTH; 


-Elevated D-Dimer no PE on cTA chest


-DVT prophylaxis: sq heparin


-Home reconciliation not done, home meds at [facility]'s home: RN to call [facility] and

get list of home medication so that I may reconcile home meds


full code





Initially, DDimer was ordered in ED but no CTA chest was done. Patient went up 

to the floor and Patient unable to get CTA chest/abd/pelvis because no adequate 

peripheral IV line (no IV team and multiple attempts failed), so i consulted Dr. Pinedo and he request that patient be moved to icu or IMCU for bedside line 

placement which she went briefly to IMCU, central line placed then she went back

to telemetry over the weekend. She went for CTA chest and CT abd/pelvis with 

contrast which was unremarkable. She underwent MRI brain on Monday which was 

negative for acute finding. So, she was discharged on Monday but did not leave. 

It appears she did not leave because of home health/tube feeding not set up. 

Today, it appears she is having trouble getting a ride home. I have expressed my

concerns to case management and nursing. 





History


Interval history: 


Patient was seen and examined. Follow-up on current diagnosis of abd pains, 

tolerating percocet. Overnight uneventful. Patient denies any chest pain, 

shortness breath, nausea/vomiting or severe headaches. Imaging, nursing note, 

chart, labs and old chart reviewed. Discussed with patient. Tolerating tube 

feedings





Hospitalist Physical





- Physical exam


Narrative exam: 


Gen: WDWN, NAD, Awake, Alert, Orientated x 3


HEENT: NCAT, EOMI, PERRL, OP Clear 


Neck: supple, no adenopathy, no thyromegaly, no JVD 


CVS/Heart: RRR, normal S1S2, pulses present bilaterally 


Chest/Lungs: CTA B, Symmetrical chest expansion, good air entry bilaterally 


GI/Abdomen: soft, diffuse tenderness, peg in place, good bowel sounds, no 

guarding or rebound 


/Bladder: no suprapubic tenderness, no CVA or paraspinal tenderness 


Extermity/Skin: no c/c/e, no obvious rash 


MSK: FROM x 4 


Neuro: CN 2-12 grossly intact, no new focal deficits 


Psych: calm but poor memory recall.








- Constitutional


Vitals: 


                                        











Temp Pulse Resp BP Pulse Ox


 


 97.6 F   94 H  16   137/70   100 


 


 19 07:53  19 07:53  19 07:53  19 07:53  19 07:53














Results





- Labs


CBC & Chem 7: 


                                 19 07:10





                                 19 07:10


Labs: 


                             Laboratory Last Values











WBC  6.4 K/mm3 (4.5-11.0)   19  07:10    


 


RBC  4.36 M/mm3 (3.65-5.03)   19  07:10    


 


Hgb  12.9 gm/dl (10.1-14.3)   19  07:10    


 


Hct  39.7 % (30.3-42.9)   19  07:10    


 


MCV  91 fl (79-97)   19  07:10    


 


MCH  30 pg (28-32)   19  07:10    


 


MCHC  33 % (30-34)   19  07:10    


 


RDW  13.1 % (13.2-15.2)  L  19  07:10    


 


Plt Count  252 K/mm3 (140-440)   19  07:10    


 


Lymph % (Auto)  35.0 % (13.4-35.0)   19  23:13    


 


Mono % (Auto)  6.7 % (0.0-7.3)   19  23:13    


 


Eos % (Auto)  0.9 % (0.0-4.3)   19  23:13    


 


Baso % (Auto)  1.0 % (0.0-1.8)   19  23:13    


 


Lymph #  2.8 K/mm3 (1.2-5.4)   19  23:13    


 


Mono #  0.5 K/mm3 (0.0-0.8)   19  23:13    


 


Eos #  0.1 K/mm3 (0.0-0.4)   19  23:13    


 


Baso #  0.1 K/mm3 (0.0-0.1)   19  23:13    


 


Seg Neutrophils %  56.4 % (40.0-70.0)   19  23:13    


 


Seg Neutrophils #  4.4 K/mm3 (1.8-7.7)   19  23:13    


 


D-Dimer  2420.43 ng/mlDDU (0-234)  H  19  06:57    


 


Sodium  144 mmol/L (137-145)  D 19  07:10    


 


Potassium  3.8 mmol/L (3.6-5.0)   19  07:10    


 


Chloride  105.5 mmol/L ()   19  07:10    


 


Carbon Dioxide  26 mmol/L (22-30)   19  07:10    


 


Anion Gap  16 mmol/L  19  07:10    


 


BUN  5 mg/dL (7-17)  L  19  07:10    


 


Creatinine  1.2 mg/dL (0.7-1.2)   19  07:10    


 


Estimated GFR  55 ml/min  19  07:10    


 


BUN/Creatinine Ratio  4 %  19  07:10    


 


Glucose  60 mg/dL ()  L  19  07:10    


 


POC Glucose  105  ()   19  12:09    


 


Lactic Acid  1.00 mmol/L (0.7-2.0)   19  06:57    


 


Calcium  10.9 mg/dL (8.4-10.2)  H  19  07:10    


 


Total Bilirubin  0.40 mg/dL (0.1-1.2)   19  23:13    


 


AST  14 units/L (5-40)   19  23:13    


 


ALT  15 units/L (7-56)   19  23:13    


 


Alkaline Phosphatase  35 units/L ()   19  23:13    


 


Total Creatine Kinase  67 units/L ()   19  21:08    


 


Troponin T  < 0.010 ng/mL (0.00-0.029)   19  23:13    


 


Total Protein  6.8 g/dL (6.3-8.2)   19  23:13    


 


Albumin  4.8 g/dL (3.9-5)   19  23:13    


 


Albumin/Globulin Ratio  2.4 %  19  23:13    


 


PTH Intact  67.17 pg/mL (15-65)  H  19  21:08    














Nutrition/Malnutrition Assess





- Dietary Evaluation


Nutrition/Malnutrition Findings: 


                                        





Nutrition Notes                                            Start:  19 

16:40


Freq:                                                      Status: Active       




Protocol:                                                                       




 Document     19 09:14  EB  (Rec: 19 09:42  EB  [id number])


 Co-Sign      19 09:14  OL


 Nutrition Notes


     Initial or Follow up                        Reassessment


     Current Diagnosis                           Diabetes


                                                 Hypertension


     Current Diet                                Jevity 1.2 at 50 mL/hr


     Labs/Tests                                  Reviewed


     Pertinent Medications                       Reviewed


     Height                                      5 ft 1 in


     Weight                                      70.5 kg


     Ideal Body Weight (lbs)                     105.0


     BMI                                         29.3


     Subjective/Other Information                Pt scheduled to discharge soon


                                                 . RN requests bolus TF order


                                                 change. TF discharge packet


                                                 given to Case Management.


     Burn                                        Absent


     Trauma                                      Absent


     #1


      Nutrition Diagnosis                        Inadequate oral intake


      Etiology                                   swallowing difficulty


      As Evidenced by Signs and Symptoms         presence of PEG and


                                                 requirement of EN support


      Diagnosis Progress(for reassessment        Continues


       documentation)                            


     Is patient on ventilator?                   No


     Is Patient Ambulatory and/or Out of Bed     No


     REE-([facility]-confined to bed)      1430.643


     Calculation Used for Recommendations        [facility]


     Additional Notes                            Pro needs 1-1.2g/k-83g/


                                                 day


                                                 Fluid needs per MD.


 Nutrition Intervention


     Nutrition Support:                          Discharge TF: Jevity 1.2 (


                                                 bolus)


                                                 1 can breakfast


                                                 2 cans lunch


                                                 2 cans dinner


                                                 Flush 80 mL a/c meals


     Kcal                                        1,422


     Protein (gm)                                66


     Fiber (gm)                                  21


     Goal #1                                     TF tolerance


     Goal #2                                     TF to meet at least 75% energy


                                                 and pro needs


     Anticipated Discharge Needs:                Jevity 1.2 (bolus)


                                                 1 can breakfast


                                                 2 cans lunch


                                                 2 cans dinner


                                                 Flush 80 mL a/c meals


     Follow-Up By:                               19


     Additional Comments                         F/U: TF tolerance

## 2019-02-17 ENCOUNTER — HOSPITAL ENCOUNTER (EMERGENCY)
Dept: HOSPITAL 5 - ED | Age: 64
Discharge: HOME | End: 2019-02-17
Payer: MEDICARE

## 2019-02-17 VITALS — DIASTOLIC BLOOD PRESSURE: 76 MMHG | SYSTOLIC BLOOD PRESSURE: 154 MMHG
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
## 2019-02-17 DIAGNOSIS — L03.115: ICD-10-CM

## 2019-02-17 DIAGNOSIS — S70.261A: Primary | ICD-10-CM
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
## 2019-02-17 PROCEDURE — 99281 EMR DPT VST MAYX REQ PHY/QHP: CPT

## 2019-02-17 NOTE — EMERGENCY DEPARTMENT REPORT
Blank Doc





- Documentation


Documentation: 





This is a 63-year-old female that presents with right hip insect bite.  Stated 

it happened today.  Denies any other complaints.





This initial assessment diagnostic orders/clinical plan/treatment(s) is/are 

subject to change based on patient's health status, clinical progression and re-

assessment by fellow clinical providers in the ED.  Further treatment and workup

at subsequent clinical providers discretion.  Patient/guardians urged not to 

elope from ED s their condition may be serious if not clinically assessed and 

managed.  Initial orders include:


1-Patient sent to [facility] for further evaluation and treatment

## 2019-02-17 NOTE — EMERGENCY DEPARTMENT REPORT
ED General Adult HPI





- General


Chief complaint: Animal Bite


Stated complaint: BUG BITE/G-TUBE CLOGGED


Time Seen by Provider: 02/17/19 13:14


Source: patient


Mode of arrival: Ambulatory


Limitations: No Limitations





- History of Present Illness


Initial comments: 





Patient is 63 years old female with history of hypertension, diabetes, gastric 

bypass and PEG tube.  The patient presented to the ER stating that she has  

possible insect bite to the right hip while she was in bed approximately 5 hours

ago.  She denied shortness of breath, difficulty swallowing or difficulty 

breathing.  Patient also stated that she had some redness around her PEG tube.  

Patient denied any fever.





- Related Data


                                Home Medications











 Medication  Instructions  Recorded  Confirmed  Last Taken


 


Sensipar 60 mg PO QDAY 01/19/19 01/19/19 Unknown


 


Symproic 9.2 mg PO QDAY 01/19/19 01/19/19 Unknown


 


oxyCODONE /ACETAMINOPHEN 7.5 mg 01/19/19  Unknown








                                  Previous Rx's











 Medication  Instructions  Recorded  Last Taken  Type


 


AtorvaSTATin 40 mg PO QHS #30 01/22/19 Unknown Rx


 


Doxepin 100 mg PO QHS #30 01/22/19 Unknown Rx


 


Lansoprazole Solutab [Prevacid 30 mg FEEDTUBE QDAY #30 tab.rapdis 01/22/19 

Unknown Rx





Solutab]    


 


Lipase/Protease/Amylase [Pancreaze 1 each FEEDTUBE PRN PRN #30 capsule 01/22/19 

Unknown Rx





Dr 10,500 Unit]    


 


Polyethylene Glycol 3350 [Miralax 17 gm PO QDAY PRN #30 powd.pack 01/22/19 

Unknown Rx





3350]    


 


Ranitidine HCl 150 mg PO BID #15 01/22/19 Unknown Rx


 


Simple Syrup 15 ml FEEDTUBE PRN PRN #30 01/22/19 Unknown Rx





 oral.liqd   


 


Simple Syrup 30 ml FEEDTUBE PRN PRN #30 01/22/19 Unknown Rx





 oral.liqd   


 


Sodium Bicarbonate 325 mg FEEDTUBE PRN PRN #15 tablet 01/22/19 Unknown Rx


 


Topiramate 100 mg PO BID #60 01/22/19 Unknown Rx


 


oxyCODONE /ACETAMINOPHEN [Percocet 1 tab PO Q4H PRN #15 tablet 01/22/19 Unknown 

Rx





5/325 mg]    











                                    Allergies











Allergy/AdvReac Type Severity Reaction Status Date / Time


 


acetaminophen Allergy  Hives Verified 01/04/19 19:19





[From Darvocet-N 100]     


 


ketorolac [From Toradol] Allergy  Hives Verified 01/04/19 19:19


 


propoxyphene Allergy  Hives Verified 01/04/19 19:19





[From Darvocet-N 100]     


 


Tetanus Vaccines and Toxoid Allergy  Swelling Verified 02/17/19 13:16














ED Review of Systems


ROS: 


Stated complaint: BUG BITE/G-TUBE CLOGGED


Other details as noted in HPI





Comment: All other systems reviewed and negative


Constitutional: denies: chills, diaphoresis, fever


Respiratory: denies: cough, orthopnea, shortness of breath, SOB with exertion, 

SOB at rest


Cardiovascular: denies: chest pain, palpitations


Gastrointestinal: denies: abdominal pain


Neurological: denies: headache, weakness, numbness, paresthesias, confusion, ab

normal gait





ED Past Medical Hx





- Past Medical History


Previous Medical History?: Yes


Hx Hypertension: Yes


Hx Diabetes: Yes





- Surgical History


Past Surgical History?: Yes


Additional Surgical History: peg tube, gastric bypass





- Social History


Smoking Status: Never Smoker


Substance Use Type: None





- Medications


Home Medications: 


                                Home Medications











 Medication  Instructions  Recorded  Confirmed  Last Taken  Type


 


Sensipar 60 mg PO QDAY 01/19/19 01/19/19 Unknown History


 


Symproic 9.2 mg PO QDAY 01/19/19 01/19/19 Unknown History


 


oxyCODONE /ACETAMINOPHEN 7.5 mg 01/19/19  Unknown History


 


AtorvaSTATin 40 mg PO QHS #30 01/22/19  Unknown Rx


 


Doxepin 100 mg PO QHS #30 01/22/19  Unknown Rx


 


Lansoprazole Solutab [Prevacid 30 mg FEEDTUBE QDAY #30 tab.rapdis 01/22/19  

Unknown Rx





Solutab]     


 


Lipase/Protease/Amylase [Pancreaze 1 each FEEDTUBE PRN PRN #30 capsule 01/22/19 

 Unknown Rx





Dr 10,500 Unit]     


 


Polyethylene Glycol 3350 [Miralax 17 gm PO QDAY PRN #30 powd.pack 01/22/19  

Unknown Rx





3350]     


 


Ranitidine HCl 150 mg PO BID #15 01/22/19  Unknown Rx


 


Simple Syrup 15 ml FEEDTUBE PRN PRN #30 01/22/19  Unknown Rx





 oral.liqd    


 


Simple Syrup 30 ml FEEDTUBE PRN PRN #30 01/22/19  Unknown Rx





 oral.liqd    


 


Sodium Bicarbonate 325 mg FEEDTUBE PRN PRN #15 tablet 01/22/19  Unknown Rx


 


Topiramate 100 mg PO BID #60 01/22/19  Unknown Rx


 


oxyCODONE /ACETAMINOPHEN [Percocet 1 tab PO Q4H PRN #15 tablet 01/22/19  Unknown

 Rx





5/325 mg]     














ED Physical Exam





- General


Limitations: No Limitations


General appearance: alert, in no apparent distress





- Head


Head exam: Present: atraumatic, normocephalic, normal inspection





- Eye


Eye exam: Present: normal appearance





- ENT


ENT exam: Present: normal exam, normal orophraynx, mucous membranes moist





- Neck


Neck exam: Present: normal inspection, full ROM.  Absent: tenderness, 

lymphadenopathy, thyromegaly





- Respiratory


Respiratory exam: Present: normal lung sounds bilaterally.  Absent: respiratory 

distress, wheezes, rales, rhonchi, chest wall tenderness, accessory muscle use, 

decreased breath sounds, prolonged expiratory





- Cardiovascular


Cardiovascular Exam: Present: regular rate, normal rhythm, normal heart sounds





- GI/Abdominal


GI/Abdominal exam: Present: soft, normal bowel sounds.  Absent: distended, 

tenderness, guarding, rebound, rigid





- Extremities Exam


Extremities exam: Present: normal inspection, full ROM, normal capillary refill





- Back Exam


Back exam: Present: normal inspection, full ROM.  Absent: tenderness, CVA 

tenderness (R), CVA tenderness (L), muscle spasm, paraspinal tenderness





- Neurological Exam


Neurological exam: Present: alert, oriented X3, CN II-XII intact, normal gait, 

reflexes normal





- Skin


Skin exam: Present: warm, intact, normal color





ED Course





                                   Vital Signs











  02/17/19





  13:14


 


Temperature 98.3 F


 


Pulse Rate 94 H


 


Respiratory 18





Rate 


 


Blood Pressure 154/76


 


O2 Sat by Pulse 100





Oximetry 











Critical care attestation.: 


If time is entered above; I have spent that time in minutes in the direct care 

of this critically ill patient, excluding procedure time.








ED Disposition


Clinical Impression: 


 Insect bite, Cellulitis





Disposition: DC-01 TO HOME OR SELFCARE


Is pt being admited?: No


Condition: Stable


Instructions:  Insect Bite or Sting (ED), Cellulitis (ED)


Referrals: 


[facility] [Provider Group] - 3-5 Days

## 2019-04-17 ENCOUNTER — HOSPITAL ENCOUNTER (OUTPATIENT)
Dept: HOSPITAL 5 - GIO | Age: 64
Discharge: HOME | End: 2019-04-17
Attending: INTERNAL MEDICINE
Payer: MEDICARE

## 2019-04-17 VITALS — SYSTOLIC BLOOD PRESSURE: 154 MMHG | DIASTOLIC BLOOD PRESSURE: 79 MMHG

## 2019-04-17 DIAGNOSIS — R10.9: ICD-10-CM

## 2019-04-17 DIAGNOSIS — F32.9: ICD-10-CM

## 2019-04-17 DIAGNOSIS — Z98.890: ICD-10-CM

## 2019-04-17 DIAGNOSIS — R63.4: ICD-10-CM

## 2019-04-17 DIAGNOSIS — Z86.2: ICD-10-CM

## 2019-04-17 DIAGNOSIS — Z90.710: ICD-10-CM

## 2019-04-17 DIAGNOSIS — F41.9: ICD-10-CM

## 2019-04-17 DIAGNOSIS — I10: ICD-10-CM

## 2019-04-17 DIAGNOSIS — Z79.899: ICD-10-CM

## 2019-04-17 DIAGNOSIS — H40.9: ICD-10-CM

## 2019-04-17 DIAGNOSIS — K94.23: Primary | ICD-10-CM

## 2019-04-17 DIAGNOSIS — Z90.49: ICD-10-CM

## 2019-04-17 DIAGNOSIS — M19.90: ICD-10-CM

## 2019-04-17 PROCEDURE — A6250 SKIN SEAL PROTECT MOISTURIZR: HCPCS

## 2019-04-17 PROCEDURE — 43762 RPLC GTUBE NO REVJ TRC: CPT

## 2019-05-28 NOTE — GASTROENTEROLOGY PROGRESS NOTE
Assessment and Plan


1. PEG tube malfunction - s/p PEG tube exchange at bedside.  no immediate 

complications.  cont post peg care daily








Subjective


Date of service: 04/17/19


Principal diagnosis: PEG tube malfunction


Interval history: 


Ms Up presents for peg tube exchange.  No new complaints; current peg tube 

with difficulty flushing and break down.  No abd pain, or new gi complaints 

otherwise. 








Objective





- Constitutional


Vitals: 


                                        











Temp Pulse Resp BP Pulse Ox


 


 97.6 F   82   10 L  154/79   98 


 


 04/17/19 13:18  04/17/19 13:48  04/17/19 13:48  04/17/19 13:48  04/17/19 13:48











General appearance: no acute distress





- Respiratory


Respiratory effort: normal


Respiratory: bilateral: CTA





- Cardiovascular


Rhythm: regular


Heart Sounds: Present: S1 & S2





- Gastrointestinal


General gastrointestinal: Present: soft, non-tender, non-distended, other (PEG 

tube exchange performed with 20 Fr tube with internal balloon bumper without 

immediate complications)

## 2022-10-25 NOTE — GASTROENTEROLOGY PROGRESS NOTE
<ROSSY BARTHOLOMEW - Last Filed: 01/22/19 11:55>





Assessment and Plan


1.malfunctioning PEG?


-abd CT w/o acute findings (showed PEG in stomach)


-upon exam, PEG flushed w/o difficultly or leaking and site w/o s/s of infection


-currently tolerating TFs w/o evidence of abd pain or N/V


-no plans for PEG replacement at this time


-okay to continue TFs


2.constipation


-likely 2/2 chronic narcotic use


-dulcolax supp now


-increase Miralax to BID


-continue supportive care











Subjective


Date of service: 01/22/19


Principal diagnosis: malfunctioning PEG/constipation


Interval history: 


No acute distress. Denies abd pain or N/V. Tolerating TFs via PEG. No BM 

overnight or this am per nursing.








Objective





- Constitutional


Vitals: 


                                        











Temp Pulse Resp BP Pulse Ox


 


 98.4 F   114 H  20   112/57   99 


 


 01/22/19 07:58  01/22/19 08:00  01/22/19 07:58  01/22/19 07:58  01/22/19 07:58











General appearance: no acute distress





- Respiratory


Respiratory: bilateral: CTA (anterior)





- Cardiovascular


Rhythm: other (tachycardia)





- Gastrointestinal


General gastrointestinal: Present: soft, non-tender, non-distended, normal bowel

sounds, other (+PEG )





- Labs


CBC & Chem 7: 


                                 01/20/19 07:10





                                 01/20/19 07:10


Labs: 


                         Laboratory Results - last 24 hr











  01/21/19 01/21/19 01/21/19





  06:30 12:03 23:47


 


POC Glucose  114 H  123 H  227 H














  01/22/19





  06:41


 


POC Glucose  193 H














<MIKE HANEY - Last Filed: 01/22/19 17:03>





Assessment and Plan


Pt seen and examined.  Agree with note as above.  








Objective





- Constitutional


Vitals: 


                                        











Temp Pulse Resp BP Pulse Ox


 


 98.4 F   107 H  20   124/58   100 


 


 01/22/19 11:12  01/22/19 11:12  01/22/19 11:12  01/22/19 11:12  01/22/19 11:12














- Labs


CBC & Chem 7: 


                                 01/20/19 07:10





                                 01/20/19 07:10


Labs: 


                         Laboratory Results - last 24 hr











  01/21/19 01/22/19 01/22/19





  23:47 06:41 11:12


 


POC Glucose  227 H  193 H  170 H














  01/22/19





  16:37


 


POC Glucose  188 H
<ROSSY BARTHOLOMEW - Last Filed: 01/23/19 13:25>





Assessment and Plan


1.malfunctioning PEG?


-abd CT w/o acute findings (showed PEG in stomach)


-upon exam, PEG flushed w/o difficultly or leaking and site w/o s/s of infection


-currently tolerating TFs w/o evidence of abd pain or N/V


-no plans for PEG replacement at this time


-okay to continue TFs


2.constipation


-likely 2/2 chronic narcotic use


-BM x 1 yesterday


-continue daily bowel regimen with Miralax BID and dulcolax supp PRN


-continue supportive care


-no further GI recommendations at this time


-will sign off, please call if needed











Subjective


Date of service: 01/23/19


Principal diagnosis: malfunctioning PEG/constipation


Interval history: 


No acute distress. Denies abd pain or N/V. Tolerating TFs via PEG. BM x 1 

yesterday per nursing.








Objective





- Constitutional


Vitals: 


                                        











Temp Pulse Resp BP Pulse Ox


 


 97.6 F   94 H  16   137/70   100 


 


 01/23/19 07:53  01/23/19 07:53  01/23/19 07:53  01/23/19 07:53  01/23/19 07:53











General appearance: no acute distress





- Respiratory


Respiratory: bilateral: CTA





- Cardiovascular


Rhythm: regular


Heart Sounds: Present: S1 & S2





- Gastrointestinal


General gastrointestinal: Present: soft, non-tender, non-distended, normal bowel

sounds, other (+PEG)





- Labs


CBC & Chem 7: 


                                 01/20/19 07:10





                                 01/20/19 07:10


Labs: 


                         Laboratory Results - last 24 hr











  01/22/19 01/22/19 01/23/19





  11:12 16:37 00:30


 


POC Glucose  170 H  188 H  85














  01/23/19 01/23/19





  05:31 12:09


 


POC Glucose  155 H  105














<MIKE HANEY - Last Filed: 01/23/19 19:24>





Assessment and Plan





pt seen and examined.  agree with note as above.  please call as needed.





Objective





- Constitutional


Vitals: 


                                        











Temp Pulse Resp BP Pulse Ox


 


 97.6 F   96 H  16   142/63   100 


 


 01/23/19 16:22  01/23/19 16:22  01/23/19 16:22  01/23/19 16:22  01/23/19 16:22














- Labs


CBC & Chem 7: 


                                 01/20/19 07:10





                                 01/20/19 07:10


Labs: 


                         Laboratory Results - last 24 hr











  01/23/19 01/23/19 01/23/19





  00:30 05:31 12:09


 


POC Glucose  85  155 H  105
Cell Phone/PDA (specify)/Clothing